# Patient Record
Sex: FEMALE | Race: WHITE | NOT HISPANIC OR LATINO | Employment: FULL TIME | URBAN - METROPOLITAN AREA
[De-identification: names, ages, dates, MRNs, and addresses within clinical notes are randomized per-mention and may not be internally consistent; named-entity substitution may affect disease eponyms.]

---

## 2017-11-20 ENCOUNTER — HOSPITAL ENCOUNTER (OUTPATIENT)
Dept: LAB | Facility: MEDICAL CENTER | Age: 27
End: 2017-11-20
Attending: PHYSICIAN ASSISTANT
Payer: COMMERCIAL

## 2017-11-20 ENCOUNTER — OFFICE VISIT (OUTPATIENT)
Dept: URGENT CARE | Facility: CLINIC | Age: 27
End: 2017-11-20
Payer: COMMERCIAL

## 2017-11-20 VITALS
SYSTOLIC BLOOD PRESSURE: 92 MMHG | TEMPERATURE: 98.7 F | DIASTOLIC BLOOD PRESSURE: 50 MMHG | BODY MASS INDEX: 21.21 KG/M2 | WEIGHT: 132 LBS | HEIGHT: 66 IN | HEART RATE: 88 BPM | OXYGEN SATURATION: 96 % | RESPIRATION RATE: 12 BRPM

## 2017-11-20 DIAGNOSIS — R11.0 NAUSEA: ICD-10-CM

## 2017-11-20 DIAGNOSIS — R42 LIGHTHEADEDNESS: ICD-10-CM

## 2017-11-20 DIAGNOSIS — R55 NEAR SYNCOPE: ICD-10-CM

## 2017-11-20 LAB
ALBUMIN SERPL BCP-MCNC: 3.8 G/DL (ref 3.2–4.9)
ALBUMIN/GLOB SERPL: 1.2 G/DL
ALP SERPL-CCNC: 49 U/L (ref 30–99)
ALT SERPL-CCNC: 15 U/L (ref 2–50)
ANION GAP SERPL CALC-SCNC: 8 MMOL/L (ref 0–11.9)
AST SERPL-CCNC: 20 U/L (ref 12–45)
BASOPHILS # BLD AUTO: 0.7 % (ref 0–1.8)
BASOPHILS # BLD: 0.06 K/UL (ref 0–0.12)
BILIRUB SERPL-MCNC: 0.5 MG/DL (ref 0.1–1.5)
BUN SERPL-MCNC: 16 MG/DL (ref 8–22)
CALCIUM SERPL-MCNC: 9.3 MG/DL (ref 8.5–10.5)
CHLORIDE SERPL-SCNC: 104 MMOL/L (ref 96–112)
CO2 SERPL-SCNC: 24 MMOL/L (ref 20–33)
CREAT SERPL-MCNC: 0.9 MG/DL (ref 0.5–1.4)
EOSINOPHIL # BLD AUTO: 0.12 K/UL (ref 0–0.51)
EOSINOPHIL NFR BLD: 1.4 % (ref 0–6.9)
ERYTHROCYTE [DISTWIDTH] IN BLOOD BY AUTOMATED COUNT: 43.2 FL (ref 35.9–50)
GFR SERPL CREATININE-BSD FRML MDRD: >60 ML/MIN/1.73 M 2
GLOBULIN SER CALC-MCNC: 3.1 G/DL (ref 1.9–3.5)
GLUCOSE BLD-MCNC: 100 MG/DL (ref 70–100)
GLUCOSE SERPL-MCNC: 73 MG/DL (ref 65–99)
HCT VFR BLD AUTO: 38.8 % (ref 37–47)
HGB BLD-MCNC: 12.2 G/DL (ref 12–16)
IMM GRANULOCYTES # BLD AUTO: 0.03 K/UL (ref 0–0.11)
IMM GRANULOCYTES NFR BLD AUTO: 0.4 % (ref 0–0.9)
INT CON NEG: NEGATIVE
INT CON POS: POSITIVE
LYMPHOCYTES # BLD AUTO: 2.16 K/UL (ref 1–4.8)
LYMPHOCYTES NFR BLD: 25.4 % (ref 22–41)
MCH RBC QN AUTO: 29.8 PG (ref 27–33)
MCHC RBC AUTO-ENTMCNC: 31.4 G/DL (ref 33.6–35)
MCV RBC AUTO: 94.6 FL (ref 81.4–97.8)
MONOCYTES # BLD AUTO: 0.57 K/UL (ref 0–0.85)
MONOCYTES NFR BLD AUTO: 6.7 % (ref 0–13.4)
NEUTROPHILS # BLD AUTO: 5.58 K/UL (ref 2–7.15)
NEUTROPHILS NFR BLD: 65.4 % (ref 44–72)
NRBC # BLD AUTO: 0 K/UL
NRBC BLD AUTO-RTO: 0 /100 WBC
PLATELET # BLD AUTO: 304 K/UL (ref 164–446)
PMV BLD AUTO: 10.4 FL (ref 9–12.9)
POC URINE PREGNANCY TEST: NEGATIVE
POTASSIUM SERPL-SCNC: 4.2 MMOL/L (ref 3.6–5.5)
PROT SERPL-MCNC: 6.9 G/DL (ref 6–8.2)
RBC # BLD AUTO: 4.1 M/UL (ref 4.2–5.4)
SODIUM SERPL-SCNC: 136 MMOL/L (ref 135–145)
WBC # BLD AUTO: 8.5 K/UL (ref 4.8–10.8)

## 2017-11-20 PROCEDURE — 36415 COLL VENOUS BLD VENIPUNCTURE: CPT

## 2017-11-20 PROCEDURE — 80053 COMPREHEN METABOLIC PANEL: CPT

## 2017-11-20 PROCEDURE — 85025 COMPLETE CBC W/AUTO DIFF WBC: CPT

## 2017-11-20 PROCEDURE — 82962 GLUCOSE BLOOD TEST: CPT | Performed by: PHYSICIAN ASSISTANT

## 2017-11-20 PROCEDURE — 81025 URINE PREGNANCY TEST: CPT | Performed by: PHYSICIAN ASSISTANT

## 2017-11-20 PROCEDURE — 99204 OFFICE O/P NEW MOD 45 MIN: CPT | Performed by: PHYSICIAN ASSISTANT

## 2017-11-20 ASSESSMENT — ENCOUNTER SYMPTOMS: DIZZINESS: 1

## 2017-11-21 ASSESSMENT — ENCOUNTER SYMPTOMS
VERTIGO: 0
FALLS: 1
HEADACHES: 1
WHEEZING: 0
MYALGIAS: 0
JOINT SWELLING: 0
FOCAL WEAKNESS: 0
SENSORY CHANGE: 0
FEVER: 0
ABDOMINAL PAIN: 0
VOMITING: 0
LOSS OF CONSCIOUSNESS: 0
DOUBLE VISION: 0
EYE DISCHARGE: 0
EYE REDNESS: 0
SORE THROAT: 0
NAUSEA: 1
VISUAL CHANGE: 0
SEIZURES: 0
CHILLS: 0
COUGH: 0
BLURRED VISION: 0
PHOTOPHOBIA: 0
TINGLING: 0
EYE PAIN: 0
NECK PAIN: 0
SPEECH CHANGE: 0

## 2017-11-21 NOTE — PROGRESS NOTES
"Subjective:      Melanie Bruno is a 27 y.o. female who presents with Dizziness (xtoday, dizzines, fatigue, blacked out in the morning)            Pt is 26 y/o female who presents to  after an episodes of near syncope in the shower this morning. She awoke feeling a little tired, she was in the shower for a few minutes, she then felt light headed with some nausea and sat down. She started to feel better and stood back up when within a few seconds she felt similar symptoms- she again went to sit down when \"things got black\" and she fell in the tub. She did not hit her head or neck. She reports that she doesn't think that she completely had LOC- but was very close. She denies any CP with the episode. She did feel nauseated. Now she feels a little fatigued, but denies any continued lightheadedness. She does a minimal HA at this time. She denies any prior hx of hypoglycemia, or hx of other cardiac condition.   Of note she reports hx of same in the past when she has been ill or when she gives blood. She reports that her sister does have a condition that caused LOC- sounds like PoTS at this time.       Dizziness   This is a recurrent problem. The current episode started today. The problem has been resolved. Associated symptoms include headaches and nausea. Pertinent negatives include no abdominal pain, chest pain, chills, congestion, coughing, fever, joint swelling, myalgias, neck pain, rash, sore throat, urinary symptoms, vertigo, visual change or vomiting. Nothing aggravates the symptoms. She has tried nothing for the symptoms.       Review of Systems   Constitutional: Positive for malaise/fatigue. Negative for chills and fever.   HENT: Negative for congestion, ear discharge, ear pain and sore throat.    Eyes: Negative for blurred vision, double vision, photophobia, pain, discharge and redness.   Respiratory: Negative for cough and wheezing.    Cardiovascular: Negative for chest pain and leg swelling. " "  Gastrointestinal: Positive for nausea. Negative for abdominal pain and vomiting.   Genitourinary: Negative for dysuria and urgency.   Musculoskeletal: Positive for falls. Negative for joint pain, joint swelling, myalgias and neck pain.   Skin: Negative for itching and rash.   Neurological: Positive for dizziness and headaches. Negative for vertigo, tingling, sensory change, speech change, focal weakness, seizures and loss of consciousness.   All other systems are reviewed and are negative.          Objective:     BP (!) 92/50   Pulse 88   Temp 37.1 °C (98.7 °F)   Resp 12   Ht 1.676 m (5' 6\")   Wt 59.9 kg (132 lb)   SpO2 96%   BMI 21.31 kg/m²    PMH:  has no past medical history on file.  MEDS:   Current Outpatient Prescriptions:   •  NON SPECIFIED, , Disp: , Rfl:   ALLERGIES:   Allergies   Allergen Reactions   • Sulfa Drugs      SURGHX: No past surgical history on file.  SOCHX:  reports that she has never smoked. She has never used smokeless tobacco.  FH: Family history was reviewed, no pertinent findings to report    Physical Exam   Constitutional: She is oriented to person, place, and time. She appears well-developed and well-nourished.   HENT:   Head: Normocephalic and atraumatic.   Right Ear: External ear normal.   Left Ear: External ear normal.   Nose: Nose normal.   Mouth/Throat: Oropharynx is clear and moist. No oropharyngeal exudate.   Eyes: EOM are normal. Pupils are equal, round, and reactive to light.   Neck: Normal range of motion. Neck supple.   Cardiovascular: Normal rate, regular rhythm and intact distal pulses.  Exam reveals no friction rub.    No murmur heard.  Pulmonary/Chest: Effort normal and breath sounds normal. No respiratory distress.   Musculoskeletal: Normal range of motion. She exhibits no edema.   Lymphadenopathy:     She has no cervical adenopathy.   Neurological: She is alert and oriented to person, place, and time. She displays normal reflexes. No cranial nerve deficit. She " exhibits normal muscle tone. Coordination normal.   Neg. Finger to nose, neg. Pronator drift, neg. Rhomberg. OLIVER's appropriate. Gait steady.    Skin: Skin is warm. No rash noted.   Psychiatric: She has a normal mood and affect. Her behavior is normal.   Vitals reviewed.           POC HCG- negative. POC glucose 100  EKG: NSR at a rate of 78- Without acute ST changes, normal axis, no old to compare.   Orthos- non-orthostatic- see vitals.     Assessment/Plan:     1. Near syncope  - POCT PREGNANCY  - EKG  - CBC WITH DIFFERENTIAL; Future  - COMP METABOLIC PANEL; Future  - POCT Glucose    2. Nausea  3. Lightheadedness    At this time it does appear that the provocative factor was the hot steaming shower of which the patient may of locked her knees. Patient and her boyfriend were very concerned about  Such today- EKG WNL, not with Orthos at this time, glucose is normal, and she is not pregnant at this time.   I will R/O anemia, and electrolyte abnormalities. If such is WNL will make referral to cardiology as they may consider Holter monitor.   I will alert this patient as results return.   Pt. Was agreeable to plan- at this time patient is asymptomatic without any lightheadedness.   Patient given precautionary s/sx that mandate immediate follow up and evaluation in the ED. Advised of risks of not doing so.    DDX, Supportive care, and indications for immediate follow-up discussed with patient.    Instructed to return to clinic or nearest emergency department if we are not available for any change in condition, further concerns, or worsening of symptoms.    The patient demonstrated a good understanding and agreed with the treatment plan.

## 2017-11-27 ENCOUNTER — TELEPHONE (OUTPATIENT)
Dept: CARDIOLOGY | Facility: MEDICAL CENTER | Age: 27
End: 2017-11-27

## 2017-11-30 ENCOUNTER — TELEPHONE (OUTPATIENT)
Dept: CARDIOLOGY | Facility: MEDICAL CENTER | Age: 27
End: 2017-11-30

## 2017-11-30 ENCOUNTER — OFFICE VISIT (OUTPATIENT)
Dept: CARDIOLOGY | Facility: MEDICAL CENTER | Age: 27
End: 2017-11-30
Payer: COMMERCIAL

## 2017-11-30 VITALS
WEIGHT: 135 LBS | SYSTOLIC BLOOD PRESSURE: 102 MMHG | OXYGEN SATURATION: 97 % | HEART RATE: 70 BPM | BODY MASS INDEX: 21.69 KG/M2 | DIASTOLIC BLOOD PRESSURE: 70 MMHG | HEIGHT: 66 IN

## 2017-11-30 DIAGNOSIS — R55 SYNCOPE, UNSPECIFIED SYNCOPE TYPE: ICD-10-CM

## 2017-11-30 LAB — EKG IMPRESSION: NORMAL

## 2017-11-30 PROCEDURE — 93000 ELECTROCARDIOGRAM COMPLETE: CPT | Performed by: INTERNAL MEDICINE

## 2017-11-30 PROCEDURE — 99203 OFFICE O/P NEW LOW 30 MIN: CPT | Performed by: INTERNAL MEDICINE

## 2017-11-30 ASSESSMENT — ENCOUNTER SYMPTOMS
HEADACHES: 1
BLURRED VISION: 1
WEAKNESS: 1

## 2017-11-30 NOTE — PROGRESS NOTES
Cardiology Initial Consultation Note    Date of note:    11/30/2017    Primary Care Provider: Pcp Pt States None  Referring Provider: David Garrison P.A.    Patient Name: Melanie Bruno   YOB: 1990  MRN:              [id number]    Chief Complaint: syncope    History of Present Illness: Melanie Bruno is a 27 y.o. Female with no past medical history who is here for cardiac consultation for syncope    She reports about a week and a half ago she was in the shower and had acute onset of lightheadedness/dizziness and decrement in her vision.  She laid down in the shower and this improved, and when she stood back up to finish her shower, she then had severe nausea and started to lay back down, but passed out.  Very brief LOC per report and she woke up almost immediately and laid on the floor for 5 minutes with complete resolution of symptoms.  She was evaluated with an  EKG in urgent care 9/20/17 and had a normal EKG and lab tests at that time.  She was scheduled for cardiology follow-up. Before this episode, she denied any illnesses, abdominal discomfort or diarrhea/vomiting, decreased PO intake, or alcohol or drug use.     She has had one prior episode of syncope, while using bathroom while she was febrile and had a viral illness.  This was 5 years ago.    She has had no recurrent syncope.     1-2 cups of caffeine (coffee/day)     Review of Systems   Constitution: Positive for weakness and malaise/fatigue.   Eyes: Positive for blurred vision.   Neurological: Positive for headaches.   Allergic/Immunologic: Positive for environmental allergies.         All other systems reviewed by comprehensive questionnaire and are negative.       No PMHx or PSHx.     Current Outpatient Prescriptions   Medication Sig Dispense Refill   • NON SPECIFIED        No current facility-administered medications for this visit.      On birth control     Allergies   Allergen Reactions   • Sulfa Drugs   "        Family History   Problem Relation Age of Onset   • Other Sister      vasovagal syncope         Social History     Social History   • Marital status: Single     Spouse name: N/A   • Number of children: N/A   • Years of education: N/A     Occupational History   • Not on file.     Social History Main Topics   • Smoking status: Never Smoker   • Smokeless tobacco: Never Used   • Alcohol use Yes      Comment: occ.   • Drug use: Unknown   • Sexual activity: Not on file     Other Topics Concern   • Not on file     Social History Narrative   • No narrative on file         Physical Exam:  Ambulatory Vitals  Blood pressure 102/70, pulse 70, height 1.676 m (5' 6\"), weight 61.2 kg (135 lb), SpO2 97 %.   Oxygen Therapy:  Pulse Oximetry: 97 %  BP Readings from Last 4 Encounters:   11/30/17 102/70   11/20/17 (!) 92/50       Weight/BMI: Body mass index is 21.79 kg/m².  Wt Readings from Last 4 Encounters:   11/30/17 61.2 kg (135 lb)   11/20/17 59.9 kg (132 lb)     General: Well appearing and in no apparent distress  Eyes: nl conjunctiva  ENT: OP clear  Neck: JVP 4 cm H2O, no carotid bruits  Lungs: normal respiratory effort, CTAB  Heart: RRR, no murmurs, no rubs or gallops, no edema bilateral lower extremities. No LV/RV heave on cardiac palpatation. 2+ bilateral radial pulses.  2+ bilateral dp pulses.   Abdomen: soft, non tender, non distended, no masses, normal bowel sounds.  No HSM.  Extremities/MSK: no clubbing, no cyanosis  Neurological: No focal sensory deficits  Psychiatric: Appropriate affect, A/O x 3  Skin: Warm extremities      Lab Data Review:  No results found for: CHOLSTRLTOT, LDL, HDL, TRIGLYCERIDE    Lab Results   Component Value Date/Time    SODIUM 136 11/20/2017 03:36 PM    POTASSIUM 4.2 11/20/2017 03:36 PM    CHLORIDE 104 11/20/2017 03:36 PM    CO2 24 11/20/2017 03:36 PM    GLUCOSE 73 11/20/2017 03:36 PM    BUN 16 11/20/2017 03:36 PM    CREATININE 0.90 11/20/2017 03:36 PM     CrCl cannot be calculated " (Patient's most recent lab result is older than the maximum 7 days allowed.).  Lab Results   Component Value Date/Time    ALKPHOSPHAT 49 2017 03:36 PM    ASTSGOT 20 2017 03:36 PM    ALTSGPT 15 2017 03:36 PM    TBILIRUBIN 0.5 2017 03:36 PM      Lab Results   Component Value Date/Time    WBC 8.5 2017 03:36 PM     No results found for: HBA1C  No components found for: TROP      Cardiac Imaging and Procedures Review:    EKG dated 17 : My personal interpretation is sinus arrhythmia, otherwise normal EKG.       Medical Decision Makin. Syncope, unspecified syncope type  Likely vasovagal, given lack of illness or predisposing factors in this episode, and family history, will evaluate with tilt table test.  -tilt table  -information on vasovagal syncope discussed at length and given with AVS.       Return if symptoms worsen or fail to improve.      Oleksandr Rain MD  [facility]  [facility], [address]  [address] [address]  Phone: [phone]  Fax: [phone]

## 2017-12-05 ENCOUNTER — TELEPHONE (OUTPATIENT)
Dept: CARDIOLOGY | Facility: MEDICAL CENTER | Age: 27
End: 2017-12-05

## 2017-12-05 NOTE — TELEPHONE ENCOUNTER
Patient is scheduled on 12-18-17 for a Passive tilt table at [facility] per Dr. Rain's request. Patient was told to check in at 9:30 for a 10:00 procedure.

## 2017-12-15 NOTE — TELEPHONE ENCOUNTER
Per patients request she has been rescheduled from 12-18-17 to 12-27-17 for her passive tilt table test. Patient to check in at 9:30 for a 10:00 test.

## 2017-12-27 ENCOUNTER — HOSPITAL ENCOUNTER (OUTPATIENT)
Dept: CARDIOLOGY | Facility: MEDICAL CENTER | Age: 27
End: 2017-12-27
Attending: INTERNAL MEDICINE
Payer: COMMERCIAL

## 2017-12-27 PROCEDURE — 93660 TILT TABLE EVALUATION: CPT

## 2017-12-27 NOTE — PROGRESS NOTES
"Patient for tilt table. Patient signed consent, refused IV. Patient set up to EKG leads and baseline blood pressure was obtained. Patient stood upright.  Patient declines any signs of symptoms. At minute 7 patient states\" she thinks she's going to black out\". At minute 8 patient has syncopal episode, bradycardia in the 40's and systolic BP in the 70's. Patient immediately laid down, test aborted. Patient remained in the 50-60's post for 3 minutes prior to rate going back to base line in the 70's. BP back to normal range. Patient able to sit upright and ambulate. Patient taken back to waiting room where family was.   "

## 2017-12-28 NOTE — PROCEDURES
"DATE OF SERVICE:  12/27/2017    PROCEDURE PERFORMED:  Tilt table test.    Resting EKG, sinus rhythm at 79 beats per minute, normal EKG.  Baseline blood   pressure 116/74.    COMMENTS:  The patient was tilted into the semi upright position and blood   pressure and EKG were monitored every 1 minute.  At the 7th minute, she \"felt   like blacking out\" and EKG at that time demonstrated heart rate of 55 per   minute.  The patient passed out and was returned to the horizontal position.    Her shaylee heart rate was 46 beats per minute and systolic blood pressure was 70   systolic.  The patient then recovered without incident.    IMPRESSION:  Tilt table test positive for syncope with associated bradycardia   and hypotension with lowest heart rate 46 beats per minute.  The patient had   an uneventful and spontaneous recovery when returned to the supine position.       ____________________________________     MD SHELLIE ARRIAGA / JIN    DD:  12/27/2017 17:28:22  DT:  12/27/2017 19:09:23    D#:  [id number]  Job#:  [id number]    "

## 2017-12-29 ENCOUNTER — TELEPHONE (OUTPATIENT)
Dept: CARDIOLOGY | Facility: MEDICAL CENTER | Age: 27
End: 2017-12-29

## 2017-12-30 NOTE — TELEPHONE ENCOUNTER
Attempted to call Laureen to discuss results of tilt table test.    Please let her know it was positive as suspected for vasovagal syncope.  Patient instructions and advice to avoid recurrent syncope were discussed at our last visit, but please see if she has any questions.  She can also schedule a follow-up appointment if she would like to speak further. Thanks!

## 2018-01-03 NOTE — TELEPHONE ENCOUNTER
Second call attempted, no answer, left vm to call back     Letter mailed to pt with above information with information above vasovagal syncope printed from [facility]

## 2018-02-21 ENCOUNTER — OFFICE VISIT (OUTPATIENT)
Dept: INTERNAL MEDICINE | Facility: MEDICAL CENTER | Age: 28
End: 2018-02-21
Payer: COMMERCIAL

## 2018-02-21 ENCOUNTER — HOSPITAL ENCOUNTER (OUTPATIENT)
Facility: MEDICAL CENTER | Age: 28
End: 2018-02-21
Attending: INTERNAL MEDICINE
Payer: COMMERCIAL

## 2018-02-21 VITALS
WEIGHT: 126.6 LBS | HEART RATE: 78 BPM | BODY MASS INDEX: 20.34 KG/M2 | SYSTOLIC BLOOD PRESSURE: 110 MMHG | TEMPERATURE: 99.2 F | HEIGHT: 66 IN | OXYGEN SATURATION: 95 % | DIASTOLIC BLOOD PRESSURE: 63 MMHG

## 2018-02-21 DIAGNOSIS — R10.2 ACUTE PELVIC PAIN: ICD-10-CM

## 2018-02-21 DIAGNOSIS — Z00.00 HEALTHCARE MAINTENANCE: ICD-10-CM

## 2018-02-21 LAB
APPEARANCE UR: CLEAR
BILIRUB UR STRIP-MCNC: NEGATIVE MG/DL
COLOR UR AUTO: YELLOW
GLUCOSE UR STRIP.AUTO-MCNC: NEGATIVE MG/DL
INT CON NEG: NORMAL
INT CON POS: NORMAL
KETONES UR STRIP.AUTO-MCNC: NEGATIVE MG/DL
LEUKOCYTE ESTERASE UR QL STRIP.AUTO: NEGATIVE
NITRITE UR QL STRIP.AUTO: NEGATIVE
PH UR STRIP.AUTO: 7.5 [PH] (ref 5–8)
POC URINE PREGNANCY TEST: NEGATIVE
PROT UR QL STRIP: NEGATIVE MG/DL
RBC UR QL AUTO: NEGATIVE
SP GR UR STRIP.AUTO: 1
UROBILINOGEN UR STRIP-MCNC: NEGATIVE MG/DL

## 2018-02-21 PROCEDURE — 87086 URINE CULTURE/COLONY COUNT: CPT

## 2018-02-21 PROCEDURE — 81025 URINE PREGNANCY TEST: CPT | Performed by: INTERNAL MEDICINE

## 2018-02-21 PROCEDURE — 81002 URINALYSIS NONAUTO W/O SCOPE: CPT | Performed by: INTERNAL MEDICINE

## 2018-02-21 PROCEDURE — 99000 SPECIMEN HANDLING OFFICE-LAB: CPT | Performed by: INTERNAL MEDICINE

## 2018-02-21 PROCEDURE — 99204 OFFICE O/P NEW MOD 45 MIN: CPT | Mod: 25 | Performed by: INTERNAL MEDICINE

## 2018-02-21 RX ORDER — IBUPROFEN 200 MG
200 TABLET ORAL EVERY 8 HOURS PRN
Qty: 20 TAB | Refills: 0 | Status: SHIPPED | OUTPATIENT
Start: 2018-02-21 | End: 2018-02-27

## 2018-02-21 ASSESSMENT — PATIENT HEALTH QUESTIONNAIRE - PHQ9: CLINICAL INTERPRETATION OF PHQ2 SCORE: 0

## 2018-02-21 ASSESSMENT — PAIN SCALES - GENERAL: PAINLEVEL: 7=MODERATE-SEVERE PAIN

## 2018-02-21 NOTE — PROGRESS NOTES
New Patient to Establish    Reason to establish: Evaluation of pelvic pain    CC: Pelvic pain    HPI: 27-year-old female patient with past medical history of TMJ joint, acne, irregular periods comes in for evaluation of ongoing pelvic pain.  Patient states she went skiing last weekend and since then experiencing left ongoing pelvic pain. Started mild in intensity initially but subsequently gotten worse 6/10 in intensity, dull in quality. No radiation. Walking makes the pain worse. Less in intensity when she is resting. Denies fever, chills, nausea, vomiting, constipation, diarrhea, blood or mucus in the stool, dark stools, pain during urination, burning urination, vaginal discharge, vaginal bleeding, dyspareunia.  Patient states she has irregular periods especially when she is stressed. Her last period was in December. She was on birth control pills until last week of November and she got off of the birth control pills since then. After her last period in December she did not have any menstrual cycles since then. She states she sometimes doesn't get menstrual cycle when she is under a lot of stressors. She states she was stressed for the month of January. Currently sexually active with single partner. Denies smoking, illicit drug use. Drinks alcohol 1 or 2 glasses of wine per week occasionally.    Patient Active Problem List    Diagnosis Date Noted   • Temporomandibular joint disorder 03/10/2010   • Other acne 08/21/2007       History reviewed. No pertinent past medical history.    Current Outpatient Prescriptions   Medication Sig Dispense Refill   • ibuprofen (MOTRIN) 200 MG Tab Take 1 Tab by mouth every 8 hours as needed for Mild Pain. 20 Tab 0   • NON SPECIFIED        No current facility-administered medications for this visit.        Allergies as of 02/21/2018 - Reviewed 02/21/2018   Allergen Reaction Noted   • Bactrim ds  12/31/2015   • Sulfa drugs  11/20/2017       Social History     Social History   • Marital  "status: Single     Spouse name: N/A   • Number of children: N/A   • Years of education: N/A     Occupational History   • Not on file.     Social History Main Topics   • Smoking status: Never Smoker   • Smokeless tobacco: Never Used   • Alcohol use 3.0 oz/week     2 Glasses of wine, 3 Standard drinks or equivalent per week      Comment: once or twice a week   • Drug use: No   • Sexual activity: Yes     Partners: Male     Birth control/ protection: Condom     Other Topics Concern   • Not on file     Social History Narrative   • No narrative on file       Family History   Problem Relation Age of Onset   • Depression Brother    • Stroke Maternal Grandmother    • Breast Cancer Maternal Grandmother    • Alzheimer's Disease Maternal Grandmother    • Dementia Paternal Grandmother    • Stroke Paternal Grandfather    • Diabetes Paternal Grandfather        History reviewed. No pertinent surgical history.    ROS: As per HPI. Additional pertinent symptoms as noted below.    Constitutional: Denies fever/chills/weight changes.   Eyes: Denies changes/pain in vision  ENT: Denies sore throat/congestion/ear ache.   Cardiovascular: Denies chest pain /palpitations/edema.   Respiratory: Denies SOB/cough/PND/orthopnea  Abdomen: Denies difficulty swallowing/ diarrhea/constipation/abdominal pain/nausea/vomiting  Genitourinary: Normal urinary habits. Positive for left pelvic pain  Musculo-skeletal: normal ambulation.Denies muscle or joint pains.  Skin: Denies rash/lesions.  Neurological: Denies weakness/tingling/numbness/headache  Psychological: good mood and cooperative. Denies anxiety /depression       /63   Pulse 78   Temp 37.3 °C (99.2 °F)   Ht 1.676 m (5' 6\")   Wt 57.4 kg (126 lb 9.6 oz)   SpO2 95%   BMI 20.43 kg/m²     Physical Exam  General:  Alert and oriented, No apparent distress.    Eyes: Pupils equal and reactive. No scleral icterus.    Throat: Clear no erythema or exudates noted.    Neck: Supple. No lymphadenopathy " noted. Thyroid not enlarged.    Lungs: Clear to auscultation and percussion bilaterally.    Cardiovascular: Regular rate and rhythm. No murmurs, rubs or gallops.    Abdomen:  Benign. No rebound or guarding noted. Tenderness noted in the left lower quadrant and suprapubic region.    Extremities: No clubbing, cyanosis, edema.    Skin: Clear. No rash or suspicious skin lesions noted.    Neurological: Oriented to time, place, and person .Cranial nerves intact. No motor/sensory deficits.Reflexes were normal and symmetrical in both upper and lower extremities     Musculoskeletal : NROM of all extremities. No tenderness or deformity noted.       Assessment and Plan    1. Acute pelvic pain  - Patient complains of left sided pelvic pain for the past 1 week  -On examination tenderness noted in the left lower quadrant and suprapubic region  -Likely differentials-ovarian cyst, torsion, endometritis, PID, endometriosis, torsion of adnexa, sprain or strain, musculoskeletal, cystitis, urethritis, calculi  -Ordered pregnant test and urine analysis in the clinic-which are negative  -Ordered ultrasound of the pelvis  -Advised to take Motrin 1 tablet as needed every 8 hours for symptomatic pain  -Follow-up in one week      2. Healthcare maintenance  - Up to date regarding vaccinations-Gardasil, tetanus vaccination at the age of 20.  - Last Pap smear 2-3 years back-was negative and due for one this year. Will consider ordering in the next visit.  - Not a candidate for mammogram, colonoscopy, DEXA scan yet        Risk Assessment (discuss potential complications a function of chronic problems): spent 35 min's discussing plans of management and educating patient regarding her current condition and related complications    Complexity (discuss number of co-morbidities): Discussed differentials for acute pelvic pain-endometriosis, ovarian torsion, musculoskeletal, cystitis, PID, endometritis, sprain or strain, torsion of adnexa, ovarian  cyst, urethritis, cystitis, calculi.    Signed by: Latisha Solis M.D.

## 2018-02-21 NOTE — PATIENT INSTRUCTIONS
Pelvic Pain, Female  Female pelvic pain can be caused by many different things and start from a variety of places. Pelvic pain refers to pain that is located in the lower half of the abdomen and between your hips. The pain may occur over a short period of time (acute) or may be reoccurring (chronic). The cause of pelvic pain may be related to disorders affecting the female reproductive organs (gynecologic), but it may also be related to the bladder, kidney stones, an intestinal complication, or muscle or skeletal problems. Getting help right away for pelvic pain is important, especially if there has been severe, sharp, or a sudden onset of unusual pain. It is also important to get help right away because some types of pelvic pain can be life threatening.   CAUSES   Below are only some of the causes of pelvic pain. The causes of pelvic pain can be in one of several categories.   · Gynecologic.  ¨ Pelvic inflammatory disease.  ¨ Sexually transmitted infection.  ¨ Ovarian cyst or a twisted ovarian ligament (ovarian torsion).  ¨ Uterine lining that grows outside the uterus (endometriosis).  ¨ Fibroids, cysts, or tumors.  ¨ Ovulation.  · Pregnancy.  ¨ Pregnancy that occurs outside the uterus (ectopic pregnancy).  ¨ Miscarriage.  ¨ Labor.  ¨ Abruption of the placenta or ruptured uterus.  · Infection.  ¨ Uterine infection (endometritis).  ¨ Bladder infection.  ¨ Diverticulitis.  ¨ Miscarriage related to a uterine infection (septic ).  · Bladder.  ¨ Inflammation of the bladder (cystitis).  ¨ Kidney stone(s).  · Gastrointestinal.  ¨ Constipation.  ¨ Diverticulitis.  · Neurologic.  ¨ Trauma.  ¨ Feeling pelvic pain because of mental or emotional causes (psychosomatic).  · Cancers of the bowel or pelvis.  EVALUATION   Your caregiver will want to take a careful history of your concerns. This includes recent changes in your health, a careful gynecologic history of your periods (menses), and a sexual history. Obtaining  your family history and medical history is also important. Your caregiver may suggest a pelvic exam. A pelvic exam will help identify the location and severity of the pain. It also helps in the evaluation of which organ system may be involved. In order to identify the cause of the pelvic pain and be properly treated, your caregiver may order tests. These tests may include:   · A pregnancy test.  · Pelvic ultrasonography.  · An X-ray exam of the abdomen.  · A urinalysis or evaluation of vaginal discharge.  · Blood tests.  HOME CARE INSTRUCTIONS   · Only take over-the-counter or prescription medicines for pain, discomfort, or fever as directed by your caregiver.    · Rest as directed by your caregiver.    · Eat a balanced diet.    · Drink enough fluids to make your urine clear or pale yellow, or as directed.    · Avoid sexual intercourse if it causes pain.    · Apply warm or cold compresses to the lower abdomen depending on which one helps the pain.    · Avoid stressful situations.    · Keep a journal of your pelvic pain. Write down when it started, where the pain is located, and if there are things that seem to be associated with the pain, such as food or your menstrual cycle.  · Follow up with your caregiver as directed.    SEEK MEDICAL CARE IF:  · Your medicine does not help your pain.  · You have abnormal vaginal discharge.  SEEK IMMEDIATE MEDICAL CARE IF:   · You have heavy bleeding from the vagina.    · Your pelvic pain increases.    · You feel light-headed or faint.    · You have chills.    · You have pain with urination or blood in your urine.    · You have uncontrolled diarrhea or vomiting.    · You have a fever or persistent symptoms for more than 3 days.  · You have a fever and your symptoms suddenly get worse.    · You are being physically or sexually abused.    MAKE SURE YOU:  · Understand these instructions.  · Will watch your condition.  · Will get help if you are not doing well or get worse.     This  information is not intended to replace advice given to you by your health care provider. Make sure you discuss any questions you have with your health care provider.     Document Released: 11/14/2005 Document Revised: 05/03/2016 Document Reviewed: 04/08/2013  [employer] Interactive Patient Education ©2016 [employer]

## 2018-02-21 NOTE — LETTER
February 21, 2018         Melanie Bruno  [address]  [facility] [address]        Dear Melanie:      Below are the results from your recent visit:    Resulted Orders   POCT Pregnancy   Result Value Ref Range    POC Urine Pregnancy Test negative Negative    Internal Control Positive Valid     Internal Control Negative Valid    POCT Urinalysis   Result Value Ref Range    POC Color yellow Negative    POC Appearance clear Negative    POC Leukocyte Esterase negative Negative    POC Nitrites negative Negative    POC Urobiligen negative Negative (0.2) mg/dL    POC Protein negative Negative mg/dL    POC Urine PH 7.5 5.0 - 8.0    POC Blood negative Negative    POC Specific Gravity 1.000 <1.005 - >1.030    POC Ketones negative Negative mg/dL    POC Biliruben negative Negative mg/dL    POC Glucose negative Negative mg/dL     The test results show  normal .    If you have any questions or concerns, please don't hesitate to call.        Sincerely,      Latisha Solis M.D.    Electronically Signed

## 2018-02-23 ENCOUNTER — HOSPITAL ENCOUNTER (OUTPATIENT)
Dept: RADIOLOGY | Facility: MEDICAL CENTER | Age: 28
End: 2018-02-23
Attending: INTERNAL MEDICINE
Payer: COMMERCIAL

## 2018-02-23 DIAGNOSIS — R10.2 ACUTE PELVIC PAIN: ICD-10-CM

## 2018-02-23 LAB
BACTERIA UR CULT: NORMAL
SIGNIFICANT IND 70042: NORMAL
SITE SITE: NORMAL
SOURCE SOURCE: NORMAL

## 2018-02-23 PROCEDURE — 76857 US EXAM PELVIC LIMITED: CPT

## 2018-02-27 ENCOUNTER — OFFICE VISIT (OUTPATIENT)
Dept: INTERNAL MEDICINE | Facility: MEDICAL CENTER | Age: 28
End: 2018-02-27
Payer: COMMERCIAL

## 2018-02-27 VITALS
WEIGHT: 128.4 LBS | DIASTOLIC BLOOD PRESSURE: 76 MMHG | TEMPERATURE: 98.1 F | OXYGEN SATURATION: 98 % | SYSTOLIC BLOOD PRESSURE: 112 MMHG | HEIGHT: 66 IN | BODY MASS INDEX: 20.63 KG/M2 | RESPIRATION RATE: 14 BRPM | HEART RATE: 72 BPM

## 2018-02-27 DIAGNOSIS — R10.32 LEFT INGUINAL PAIN: ICD-10-CM

## 2018-02-27 DIAGNOSIS — Z30.8 ENCOUNTER FOR OTHER CONTRACEPTIVE MANAGEMENT: ICD-10-CM

## 2018-02-27 DIAGNOSIS — R05.9 COUGH: ICD-10-CM

## 2018-02-27 PROCEDURE — 99214 OFFICE O/P EST MOD 30 MIN: CPT | Mod: GC | Performed by: INTERNAL MEDICINE

## 2018-02-27 RX ORDER — LEVONORGESTREL AND ETHINYL ESTRADIOL 0.1-0.02MG
1 KIT ORAL DAILY
Qty: 28 TAB | Refills: 12 | Status: SHIPPED | OUTPATIENT
Start: 2018-02-27 | End: 2021-05-20

## 2018-02-27 ASSESSMENT — PAIN SCALES - GENERAL: PAINLEVEL: NO PAIN

## 2018-02-27 NOTE — PROGRESS NOTES
Established patient      CC: Pelvic pain follow up    HPI: 27-year-old female patient with past medical history of TMJ joint, acne, irregular periods comes in for evaluation of ongoing pelvic pain.  Patient states she went skiing about 10 days ago and since then experiencing left ongoing pelvic pain. The pain was evaluated by our internal medicine team last week, and her had neg urine test, pregnancy test during that time. Also the patient had a trans-abd pelvic ultrasound last week, which showed a 4cm cyst at left side of ovary. The patient was taking oral contraceptive pill for years, which was stopped in the endof 2017 during to insurance/no prescription. The patient has irregular period in the last couple months and she is currently having menstrual in the office today (day 2).   The patient still has some pain at the anterior left side inguinal region close to her pubis. There was no definite change of her pain compared to last week, no improvement per the patient. The pain around 5/10 at most, mildly getting worse when palpated, no rebounding pain or true guarding in the office. There was no lesion on the skin, no bruise or any lump/bump. She was not in distress, all vitals were normal.   We discuss the finding of her ultrasound, explained to her the cyst or skeletal muscular injury might be the reason for the discomfort, no intervention needed at this moment, but the patient might need another ultrasound follow up if her symptoms are getting worse.    She also mentioned some dry cough in the past couple days without sputum, fever, or any strong evidence of infection.         Patient Active Problem List    Diagnosis Date Noted   • Left inguinal pain 02/27/2018   • Encounter for other contraceptive management 02/27/2018   • Cough 02/27/2018   • Temporomandibular joint disorder 03/10/2010   • Other acne 08/21/2007       History reviewed. No pertinent past medical history.    Current Outpatient Prescriptions    Medication Sig Dispense Refill   • levonorgestrel-ethinyl estradiol (AVIANE, ALESSE, LESSINA) 0.1-20 MG-MCG per tablet Take 1 Tab by mouth every day. 28 Tab 12   • NON SPECIFIED        No current facility-administered medications for this visit.        Allergies as of 02/27/2018 - Reviewed 02/27/2018   Allergen Reaction Noted   • Bactrim ds  12/31/2015   • Sulfa drugs  11/20/2017       Social History     Social History   • Marital status: Single     Spouse name: N/A   • Number of children: N/A   • Years of education: N/A     Occupational History   • Not on file.     Social History Main Topics   • Smoking status: Never Smoker   • Smokeless tobacco: Never Used   • Alcohol use 3.0 oz/week     2 Glasses of wine, 3 Standard drinks or equivalent per week      Comment: once or twice a week   • Drug use: No   • Sexual activity: Yes     Partners: Male     Birth control/ protection: Condom     Other Topics Concern   • Not on file     Social History Narrative   • No narrative on file       Family History   Problem Relation Age of Onset   • Depression Brother    • Stroke Maternal Grandmother    • Breast Cancer Maternal Grandmother    • Alzheimer's Disease Maternal Grandmother    • Dementia Paternal Grandmother    • Stroke Paternal Grandfather    • Diabetes Paternal Grandfather        History reviewed. No pertinent surgical history.    ROS: As per HPI. Additional pertinent symptoms as noted below.    Constitutional: Denies fever/chills/weight changes.   Eyes: Denies changes/pain in vision  ENT: Denies sore throat/congestion/ear ache.   Cardiovascular: Denies chest pain /palpitations/edema.   Respiratory: Denies SOB/cough/PND/orthopnea  Abdomen: Denies difficulty swallowing/ diarrhea/constipation/abdominal pain/nausea/vomiting  Genitourinary: Normal urinary habits. Positive for left pelvic pain, left side, low inguinal region.   Musculo-skeletal: normal ambulation.Denies muscle or joint pains.  Skin: Denies  "rash/lesions.  Neurological: Denies weakness/tingling/numbness/headache  Psychological: good mood and cooperative. Denies anxiety /depression       /76   Pulse 72   Temp 36.7 °C (98.1 °F)   Resp 14   Ht 1.676 m (5' 6\")   Wt 58.2 kg (128 lb 6.4 oz)   SpO2 98%   BMI 20.72 kg/m²     Physical Exam  HEENT: grossly normal   Cardiovascular: Normal heart rate, Normal rhythm   Lungs: Respiratory effort is normal. Normal breath sounds  Abdomen: Tenderness noted in the left lower quadrant and suprapubic region.  Skin: No erythema, No rash  Lower limbs: normal, no pitting edema   Neurologic: Alert & oriented x 3, Normal motor function, Normal sensory function, No focal deficits noted, cranial nerves II through XII are normal  PSY: stable mood.   Other systems also examined, grossly normal.       Abdomen:  Benign. No rebound or guarding noted.         Assessment and Plan  Problem List Items Addressed This Visit     Left inguinal pain  - Still presented after 1-week of observation/supportive care.   - Might be related to the ovary cyst or simple muscular injury  - No intervention at this moment, ultrasound order provided, she will do it only if the symptoms are getting worse.     Relevant Orders    US-PELVIS TRANSABDOMINAL LIMITED    Encounter for other contraceptive management  - The patient asked for being back on oral contraceptive management, we resume her Aviane.     Relevant Medications    levonorgestrel-ethinyl estradiol (AVIANE, ALESSE, LESSINA) 0.1-20 MG-MCG per tablet    Cough  - Dry cough for a couple days, no sputum, no other upper resp complaints.   - Clear breathing sound, no other concerns noted in the office.   -->Obs.      She had neg pap smear 2 yrs ago. Got all her vaccines. Will go back to see Dr. Solis for regular office visit.          Signed by: Zoey Clinton M.D.  "

## 2018-02-27 NOTE — PATIENT INSTRUCTIONS
Contraception Choices  Contraception (birth control) is the use of any methods or devices to prevent pregnancy. Below are some methods to help avoid pregnancy.  HORMONAL METHODS   · Contraceptive implant. This is a thin, plastic tube containing progesterone hormone. It does not contain estrogen hormone. Your health care provider inserts the tube in the inner part of the upper arm. The tube can remain in place for up to 3 years. After 3 years, the implant must be removed. The implant prevents the ovaries from releasing an egg (ovulation), thickens the cervical mucus to prevent sperm from entering the uterus, and thins the lining of the inside of the uterus.  · Progesterone-only injections. These injections are given every 3 months by your health care provider to prevent pregnancy. This synthetic progesterone hormone stops the ovaries from releasing eggs. It also thickens cervical mucus and changes the uterine lining. This makes it harder for sperm to survive in the uterus.  · Birth control pills. These pills contain estrogen and progesterone hormone. They work by preventing the ovaries from releasing eggs (ovulation). They also cause the cervical mucus to thicken, preventing the sperm from entering the uterus. Birth control pills are prescribed by a health care provider. Birth control pills can also be used to treat heavy periods.  · Minipill. This type of birth control pill contains only the progesterone hormone. They are taken every day of each month and must be prescribed by your health care provider.  · Birth control patch. The patch contains hormones similar to those in birth control pills. It must be changed once a week and is prescribed by a health care provider.  · Vaginal ring. The ring contains hormones similar to those in birth control pills. It is left in the vagina for 3 weeks, removed for 1 week, and then a new one is put back in place. The patient must be comfortable inserting and removing the ring  from the vagina. A health care provider's prescription is necessary.  · Emergency contraception. Emergency contraceptives prevent pregnancy after unprotected sexual intercourse. This pill can be taken right after sex or up to 5 days after unprotected sex. It is most effective the sooner you take the pills after having sexual intercourse. Most emergency contraceptive pills are available without a prescription. Check with your pharmacist. Do not use emergency contraception as your only form of birth control.  BARRIER METHODS   · Male condom. This is a thin sheath (latex or rubber) that is worn over the penis during sexual intercourse. It can be used with spermicide to increase effectiveness.  · Female condom. This is a soft, loose-fitting sheath that is put into the vagina before sexual intercourse.  · Diaphragm. This is a soft, latex, dome-shaped barrier that must be fitted by a health care provider. It is inserted into the vagina, along with a spermicidal jelly. It is inserted before intercourse. The diaphragm should be left in the vagina for 6 to 8 hours after intercourse.  · Cervical cap. This is a round, soft, latex or plastic cup that fits over the cervix and must be fitted by a health care provider. The cap can be left in place for up to 48 hours after intercourse.  · Sponge. This is a soft, circular piece of polyurethane foam. The sponge has spermicide in it. It is inserted into the vagina after wetting it and before sexual intercourse.  · Spermicides. These are chemicals that kill or block sperm from entering the cervix and uterus. They come in the form of creams, jellies, suppositories, foam, or tablets. They do not require a prescription. They are inserted into the vagina with an applicator before having sexual intercourse. The process must be repeated every time you have sexual intercourse.  INTRAUTERINE CONTRACEPTION  · Intrauterine device (IUD). This is a T-shaped device that is put in a woman's uterus  during a menstrual period to prevent pregnancy. There are 2 types:  ¨ Copper IUD. This type of IUD is wrapped in copper wire and is placed inside the uterus. Copper makes the uterus and fallopian tubes produce a fluid that kills sperm. It can stay in place for 10 years.  ¨ Hormone IUD. This type of IUD contains the hormone progestin (synthetic progesterone). The hormone thickens the cervical mucus and prevents sperm from entering the uterus, and it also thins the uterine lining to prevent implantation of a fertilized egg. The hormone can weaken or kill the sperm that get into the uterus. It can stay in place for 3-5 years, depending on which type of IUD is used.  PERMANENT METHODS OF CONTRACEPTION  · Female tubal ligation. This is when the woman's fallopian tubes are surgically sealed, tied, or blocked to prevent the egg from traveling to the uterus.  · Hysteroscopic sterilization. This involves placing a small coil or insert into each fallopian tube. Your doctor uses a technique called hysteroscopy to do the procedure. The device causes scar tissue to form. This results in permanent blockage of the fallopian tubes, so the sperm cannot fertilize the egg. It takes about 3 months after the procedure for the tubes to become blocked. You must use another form of birth control for these 3 months.  · Male sterilization. This is when the male has the tubes that carry sperm tied off (vasectomy). This blocks sperm from entering the vagina during sexual intercourse. After the procedure, the man can still ejaculate fluid (semen).  NATURAL PLANNING METHODS  · Natural family planning. This is not having sexual intercourse or using a barrier method (condom, diaphragm, cervical cap) on days the woman could become pregnant.  · Calendar method. This is keeping track of the length of each menstrual cycle and identifying when you are fertile.  · Ovulation method. This is avoiding sexual intercourse during ovulation.  · Symptothermal  method. This is avoiding sexual intercourse during ovulation, using a thermometer and ovulation symptoms.  · Post-ovulation method. This is timing sexual intercourse after you have ovulated.  Regardless of which type or method of contraception you choose, it is important that you use condoms to protect against the transmission of sexually transmitted infections (STIs). Talk with your health care provider about which form of contraception is most appropriate for you.     This information is not intended to replace advice given to you by your health care provider. Make sure you discuss any questions you have with your health care provider.     Document Released: 12/18/2006 Document Revised: 12/23/2014 Document Reviewed: 06/12/2014  [employer] Patient Education ©2016 [employer]

## 2018-12-14 ENCOUNTER — OFFICE VISIT (OUTPATIENT)
Dept: URGENT CARE | Facility: CLINIC | Age: 28
End: 2018-12-14
Payer: COMMERCIAL

## 2018-12-14 VITALS
WEIGHT: 134 LBS | DIASTOLIC BLOOD PRESSURE: 68 MMHG | OXYGEN SATURATION: 96 % | BODY MASS INDEX: 21.53 KG/M2 | TEMPERATURE: 98.3 F | SYSTOLIC BLOOD PRESSURE: 116 MMHG | HEIGHT: 66 IN | HEART RATE: 78 BPM | RESPIRATION RATE: 16 BRPM

## 2018-12-14 DIAGNOSIS — J02.9 PHARYNGITIS, UNSPECIFIED ETIOLOGY: ICD-10-CM

## 2018-12-14 DIAGNOSIS — J40 BRONCHITIS: ICD-10-CM

## 2018-12-14 LAB
INT CON NEG: NORMAL
INT CON POS: NORMAL
S PYO AG THROAT QL: NEGATIVE

## 2018-12-14 PROCEDURE — 87880 STREP A ASSAY W/OPTIC: CPT | Performed by: PHYSICIAN ASSISTANT

## 2018-12-14 PROCEDURE — 99214 OFFICE O/P EST MOD 30 MIN: CPT | Performed by: PHYSICIAN ASSISTANT

## 2018-12-14 RX ORDER — AZITHROMYCIN 250 MG/1
TABLET, FILM COATED ORAL
Qty: 6 TAB | Refills: 0 | Status: SHIPPED | OUTPATIENT
Start: 2018-12-14 | End: 2021-05-20

## 2018-12-17 ASSESSMENT — ENCOUNTER SYMPTOMS
SORE THROAT: 1
SPUTUM PRODUCTION: 0
SWOLLEN GLANDS: 1
EYE PAIN: 0
SHORTNESS OF BREATH: 0
NAUSEA: 0
VOMITING: 0
WHEEZING: 0
MYALGIAS: 0
DIZZINESS: 0
ABDOMINAL PAIN: 0
TROUBLE SWALLOWING: 0
DIARRHEA: 0
COUGH: 1
HEADACHES: 0
CHILLS: 0
FEVER: 0
CONSTIPATION: 0
BLURRED VISION: 0
PALPITATIONS: 0

## 2018-12-18 NOTE — PROGRESS NOTES
Subjective:      Melanie Bruno is a 28 y.o. female who presents with Sore Throat (sore, scratchy, irritated throat with dry cough x 3 days)      Pharyngitis    This is a new problem. The current episode started in the past 7 days (Symptoms started approximately 3 days ago). The problem has been waxing and waning. Neither side of throat is experiencing more pain than the other. There has been no fever. The pain is mild. Associated symptoms include congestion, coughing and swollen glands. Pertinent negatives include no abdominal pain, diarrhea, ear discharge, ear pain, headaches, plugged ear sensation, shortness of breath, trouble swallowing or vomiting. She has had no exposure to strep or mono. She has tried nothing for the symptoms.       Review of Systems   Constitutional: Positive for malaise/fatigue. Negative for chills and fever.   HENT: Positive for congestion and sore throat. Negative for ear discharge, ear pain and trouble swallowing.    Eyes: Negative for blurred vision and pain.   Respiratory: Positive for cough. Negative for sputum production, shortness of breath and wheezing.    Cardiovascular: Negative for chest pain and palpitations.   Gastrointestinal: Negative for abdominal pain, constipation, diarrhea, nausea and vomiting.   Musculoskeletal: Negative for myalgias.   Skin: Negative for rash.   Neurological: Negative for dizziness and headaches.       PMH:  has no past medical history on file.  MEDS:   Current Outpatient Prescriptions:   •  azithromycin (ZITHROMAX) 250 MG Tab, 500 mg in a single loading dose on day 1, followed by 250 mg once daily on days 2 to 5, Disp: 6 Tab, Rfl: 0  •  levonorgestrel-ethinyl estradiol (AVIANE, ALESSE, LESSINA) 0.1-20 MG-MCG per tablet, Take 1 Tab by mouth every day., Disp: 28 Tab, Rfl: 12  •  NON SPECIFIED, , Disp: , Rfl:   ALLERGIES:   Allergies   Allergen Reactions   • Bactrim Ds      Other reaction(s): Rash, Unspecified  Pt began noting rash after  "beginning septra ds for kidney infection. 12/2015   • Sulfa Drugs      SURGHX: History reviewed. No pertinent surgical history.  SOCHX:  reports that she has never smoked. She has never used smokeless tobacco. She reports that she drinks about 3.0 oz of alcohol per week . She reports that she does not use drugs.  FH: Family history was reviewed, no pertinent findings to report     Objective:     /68 (BP Location: Right arm, Patient Position: Sitting, BP Cuff Size: Adult)   Pulse 78   Temp 36.8 °C (98.3 °F) (Temporal)   Resp 16   Ht 1.676 m (5' 5.98\")   Wt 60.8 kg (134 lb)   SpO2 96%   BMI 21.64 kg/m²        Physical Exam   Constitutional: She is oriented to person, place, and time. She appears well-developed and well-nourished.   HENT:   Head: Normocephalic and atraumatic.   Right Ear: Tympanic membrane, external ear and ear canal normal.   Left Ear: Tympanic membrane, external ear and ear canal normal.   Nose: Nose normal.   Mouth/Throat: Uvula is midline and mucous membranes are normal. Posterior oropharyngeal erythema present.   Eyes: Pupils are equal, round, and reactive to light. Conjunctivae are normal.   Neck: Normal range of motion.   Cardiovascular: Normal rate, regular rhythm and normal heart sounds.    No murmur heard.  Pulmonary/Chest: Effort normal and breath sounds normal. No accessory muscle usage. No respiratory distress. She has no decreased breath sounds. She has no wheezes. She has no rhonchi. She has no rales.   Lymphadenopathy:     She has cervical adenopathy.   Neurological: She is alert and oriented to person, place, and time.   Skin: Skin is warm and dry. Capillary refill takes less than 2 seconds.   Psychiatric: She has a normal mood and affect. Her behavior is normal. Judgment normal.       POCT Rapid Strep A - Negative    Assessment/Plan:     1. Bronchitis  - azithromycin (ZITHROMAX) 250 MG Tab; 500 mg in a single loading dose on day 1, followed by 250 mg once daily on days 2 " to 5  Dispense: 6 Tab; Refill: 0    2. Pharyngitis, unspecified etiology  - POCT Rapid Strep A          Differential Diagnosis, natural history, and supportive care discussed. Return to the Urgent Care or follow up with your PCP if symptoms fail to resolve, or for any new or worsening symptoms. Emergency room precautions discussed. Patient and/or family appears understanding of information.

## 2020-06-07 NOTE — LETTER
January 3, 2018        Melanie Bruno  [address]  [place] [address]        Dear Melanie:    Sorry we missed you over the phone. We are calling to inform you that I reviewed your recent Tilt Table Test and it was positive as suspected for vasovagal syncope. I reviewed with you on your last office visit instructions and advice to avoid recurrent syncope. I've attached some educational handouts about it also.        If you have any questions or concerns, please don't hesitate to call our office, [phone].        Sincerely,        Oleksandr Rain M.D.    Electronically Signed      Initial (On Arrival)

## 2021-03-03 ENCOUNTER — TELEPHONE (OUTPATIENT)
Dept: SCHEDULING | Facility: IMAGING CENTER | Age: 31
End: 2021-03-03

## 2021-05-20 ENCOUNTER — OFFICE VISIT (OUTPATIENT)
Dept: MEDICAL GROUP | Facility: MEDICAL CENTER | Age: 31
End: 2021-05-20
Payer: COMMERCIAL

## 2021-05-20 VITALS
WEIGHT: 123 LBS | HEIGHT: 66 IN | SYSTOLIC BLOOD PRESSURE: 100 MMHG | OXYGEN SATURATION: 97 % | DIASTOLIC BLOOD PRESSURE: 64 MMHG | BODY MASS INDEX: 19.77 KG/M2 | HEART RATE: 65 BPM | TEMPERATURE: 98.5 F

## 2021-05-20 DIAGNOSIS — B07.0 PLANTAR WART: ICD-10-CM

## 2021-05-20 DIAGNOSIS — Z00.00 WELL ADULT EXAM: ICD-10-CM

## 2021-05-20 PROBLEM — R10.32 LEFT INGUINAL PAIN: Status: RESOLVED | Noted: 2018-02-27 | Resolved: 2021-05-20

## 2021-05-20 PROBLEM — R05.9 COUGH: Status: RESOLVED | Noted: 2018-02-27 | Resolved: 2021-05-20

## 2021-05-20 PROCEDURE — 99203 OFFICE O/P NEW LOW 30 MIN: CPT | Performed by: FAMILY MEDICINE

## 2021-05-20 RX ORDER — PHENAZOPYRIDINE HYDROCHLORIDE 200 MG/1
200 TABLET, FILM COATED ORAL
COMMUNITY
End: 2021-05-20

## 2021-05-20 RX ORDER — M-VIT,TX,IRON,MINS/CALC/FOLIC 27MG-0.4MG
1 TABLET ORAL DAILY
COMMUNITY

## 2021-05-20 RX ORDER — SULFAMETHOXAZOLE AND TRIMETHOPRIM 800; 160 MG/1; MG/1
TABLET ORAL
COMMUNITY
End: 2021-05-20

## 2021-05-20 ASSESSMENT — PATIENT HEALTH QUESTIONNAIRE - PHQ9: CLINICAL INTERPRETATION OF PHQ2 SCORE: 0

## 2021-05-20 NOTE — PATIENT INSTRUCTIONS
For plantar warts:  After taking bath/shower recommend gently filing the top layer of skin, then apply salicylic acid (over-the-counter Compound W or something similar) then cover with extra strength tape cut to size.  Pull off duct tape the next evening and repeat for a few weeks until warts resolve.

## 2021-05-20 NOTE — PROGRESS NOTES
"Subjective:     CC: Diagnoses of Plantar wart and Well adult exam were pertinent to this visit.    HPI: Patient is a 30 y.o. female new patient who presents today to establish care.  She is generally quite healthy, not on any medications.  No chronic medical history.      Plantar wart  Chronic problem, present for quite some time.  Tried one OTC treatment x1 with no improvement.  Present on the left medial heel.    Family planning  The patient and her  are thinking of getting pregnant.  Patient is requesting screening blood work.  Currently on multi vit.   Reports regular periods.   Patient is a   G0, P0  Due for Pap smear    Past Medical History:   Diagnosis Date   • Vaso vagal episode        Social History     Tobacco Use   • Smoking status: Never Smoker   • Smokeless tobacco: Never Used   Vaping Use   • Vaping Use: Never used   Substance Use Topics   • Alcohol use: Yes     Alcohol/week: 3.0 oz     Types: 2 Glasses of wine, 3 Standard drinks or equivalent per week     Comment: once or twice a week   • Drug use: No       Current Outpatient Medications Ordered in Epic   Medication Sig Dispense Refill   • therapeutic multivitamin-minerals (THERAGRAN-M) Tab Take 1 tablet by mouth every day.       No current [facility]-ordered facility-administered medications on file.       Allergies:  Bactrim ds and Sulfa drugs    Health Maintenance: Completed    ROS:  Gen: no fevers/chill, no changes in weight  Eyes: no changes in vision  ENT: no sore throat, no hearing loss, no bloody nose  Pulm: no sob, no cough  CV: no chest pain, no palpitations  GI: no nausea/vomiting, no diarrhea  : no dysuria  MSk: no myalgias  Skin: no rash  Neuro: no headaches, no numbness/tingling  Heme/Lymph: no easy bruising      Objective:       Exam:  /64 (BP Location: Right arm, Patient Position: Sitting, BP Cuff Size: Adult long)   Pulse 65   Temp 36.9 °C (98.5 °F) (Temporal)   Ht 1.676 m (5' 6\")   Wt 55.8 kg (123 lb)   " LMP 2021   SpO2 97%   BMI 19.85 kg/m²  Body mass index is 19.85 kg/m².      General: Normal appearing. No distress.  HEAD: NCAT  EYES: conjunctiva clear, lids without ptosis, pupils equal  and reactive to light  EARS: ears normal shape and contour, canals are clear bilaterally, TMs clear  MOUTH: Mask in place throughout exam.  Neck: Supple without masses. Thyroid is not enlarged. Normal ROM  Pulmonary: Clear to ausculation.  Normal effort. No rales, ronchi, or wheezing.  Cardiovascular: Regular rate and rhythm, no murmur. No LE edema  Neurologic: Grossly normal, no focal deficits  Lymph: No cervical or supraclavicular lymph nodes are palpable  Skin: Warm and dry.  No obvious lesions.  Musculoskeletal: Normal gait and station.   Psych: Normal mood and affect. Alert and oriented x3. Judgment and insight is normal.      Assessment & Plan:     30 y.o. female with the following -     1. Plantar wart  Problem.  Recommended home therapy including salicylic acid and duct tape.  Instructions can be found in the after visit summary.    2.  Family planning  , hoping to get pregnant in the somewhat near future.  Reports regular periods.  Due for Pap, we will get her scheduled for that.  Screening lab work ordered.  - Comp Metabolic Panel; Future  - Lipid Profile; Future  - CBC WITH DIFFERENTIAL; Future  - TSH WITH REFLEX TO FT4; Future      Return in about 1 week (around 2021) for Pap smear.    Please note that this dictation was created using voice recognition software. I have made every reasonable attempt to correct obvious errors, but I expect that there are errors of grammar and possibly content that I did not discover before finalizing the note.

## 2021-05-25 ENCOUNTER — HOSPITAL ENCOUNTER (OUTPATIENT)
Dept: LAB | Facility: MEDICAL CENTER | Age: 31
End: 2021-05-25
Attending: FAMILY MEDICINE
Payer: COMMERCIAL

## 2021-05-25 DIAGNOSIS — Z00.00 WELL ADULT EXAM: ICD-10-CM

## 2021-05-25 LAB
ALBUMIN SERPL BCP-MCNC: 4.2 G/DL (ref 3.2–4.9)
ALBUMIN/GLOB SERPL: 1.3 G/DL
ALP SERPL-CCNC: 61 U/L (ref 30–99)
ALT SERPL-CCNC: 30 U/L (ref 2–50)
ANION GAP SERPL CALC-SCNC: 8 MMOL/L (ref 7–16)
AST SERPL-CCNC: 28 U/L (ref 12–45)
BASOPHILS # BLD AUTO: 0.9 % (ref 0–1.8)
BASOPHILS # BLD: 0.06 K/UL (ref 0–0.12)
BILIRUB SERPL-MCNC: 0.8 MG/DL (ref 0.1–1.5)
BUN SERPL-MCNC: 11 MG/DL (ref 8–22)
CALCIUM SERPL-MCNC: 9.2 MG/DL (ref 8.5–10.5)
CHLORIDE SERPL-SCNC: 105 MMOL/L (ref 96–112)
CHOLEST SERPL-MCNC: 155 MG/DL (ref 100–199)
CO2 SERPL-SCNC: 23 MMOL/L (ref 20–33)
CREAT SERPL-MCNC: 0.76 MG/DL (ref 0.5–1.4)
EOSINOPHIL # BLD AUTO: 0.06 K/UL (ref 0–0.51)
EOSINOPHIL NFR BLD: 0.9 % (ref 0–6.9)
ERYTHROCYTE [DISTWIDTH] IN BLOOD BY AUTOMATED COUNT: 39.1 FL (ref 35.9–50)
FASTING STATUS PATIENT QL REPORTED: NORMAL
GLOBULIN SER CALC-MCNC: 3.2 G/DL (ref 1.9–3.5)
GLUCOSE SERPL-MCNC: 84 MG/DL (ref 65–99)
HCT VFR BLD AUTO: 43.2 % (ref 37–47)
HDLC SERPL-MCNC: 79 MG/DL
HGB BLD-MCNC: 14.6 G/DL (ref 12–16)
IMM GRANULOCYTES # BLD AUTO: 0.01 K/UL (ref 0–0.11)
IMM GRANULOCYTES NFR BLD AUTO: 0.2 % (ref 0–0.9)
LDLC SERPL CALC-MCNC: 64 MG/DL
LYMPHOCYTES # BLD AUTO: 2.42 K/UL (ref 1–4.8)
LYMPHOCYTES NFR BLD: 36.6 % (ref 22–41)
MCH RBC QN AUTO: 31.6 PG (ref 27–33)
MCHC RBC AUTO-ENTMCNC: 33.8 G/DL (ref 33.6–35)
MCV RBC AUTO: 93.5 FL (ref 81.4–97.8)
MONOCYTES # BLD AUTO: 0.61 K/UL (ref 0–0.85)
MONOCYTES NFR BLD AUTO: 9.2 % (ref 0–13.4)
NEUTROPHILS # BLD AUTO: 3.45 K/UL (ref 2–7.15)
NEUTROPHILS NFR BLD: 52.2 % (ref 44–72)
NRBC # BLD AUTO: 0 K/UL
NRBC BLD-RTO: 0 /100 WBC
PLATELET # BLD AUTO: 229 K/UL (ref 164–446)
PMV BLD AUTO: 10.7 FL (ref 9–12.9)
POTASSIUM SERPL-SCNC: 4.5 MMOL/L (ref 3.6–5.5)
PROT SERPL-MCNC: 7.4 G/DL (ref 6–8.2)
RBC # BLD AUTO: 4.62 M/UL (ref 4.2–5.4)
SODIUM SERPL-SCNC: 136 MMOL/L (ref 135–145)
TRIGL SERPL-MCNC: 62 MG/DL (ref 0–149)
TSH SERPL DL<=0.005 MIU/L-ACNC: 2.81 UIU/ML (ref 0.38–5.33)
WBC # BLD AUTO: 6.6 K/UL (ref 4.8–10.8)

## 2021-05-25 PROCEDURE — 80053 COMPREHEN METABOLIC PANEL: CPT

## 2021-05-25 PROCEDURE — 84443 ASSAY THYROID STIM HORMONE: CPT

## 2021-05-25 PROCEDURE — 80061 LIPID PANEL: CPT

## 2021-05-25 PROCEDURE — 36415 COLL VENOUS BLD VENIPUNCTURE: CPT

## 2021-05-25 PROCEDURE — 85025 COMPLETE CBC W/AUTO DIFF WBC: CPT

## 2021-05-28 ENCOUNTER — HOSPITAL ENCOUNTER (OUTPATIENT)
Facility: MEDICAL CENTER | Age: 31
End: 2021-05-28
Attending: FAMILY MEDICINE
Payer: COMMERCIAL

## 2021-05-28 ENCOUNTER — OFFICE VISIT (OUTPATIENT)
Dept: MEDICAL GROUP | Facility: MEDICAL CENTER | Age: 31
End: 2021-05-28
Payer: COMMERCIAL

## 2021-05-28 VITALS
SYSTOLIC BLOOD PRESSURE: 98 MMHG | DIASTOLIC BLOOD PRESSURE: 62 MMHG | TEMPERATURE: 98.7 F | OXYGEN SATURATION: 96 % | BODY MASS INDEX: 19.44 KG/M2 | HEART RATE: 87 BPM | WEIGHT: 121 LBS | HEIGHT: 66 IN

## 2021-05-28 DIAGNOSIS — Z12.4 CERVICAL CANCER SCREENING: ICD-10-CM

## 2021-05-28 DIAGNOSIS — Z00.00 WELL ADULT EXAM: ICD-10-CM

## 2021-05-28 LAB — AMBIGUOUS DTTM AMBI4: NORMAL

## 2021-05-28 PROCEDURE — 87591 N.GONORRHOEAE DNA AMP PROB: CPT

## 2021-05-28 PROCEDURE — 87491 CHLMYD TRACH DNA AMP PROBE: CPT

## 2021-05-28 PROCEDURE — 88175 CYTOPATH C/V AUTO FLUID REDO: CPT

## 2021-05-28 PROCEDURE — 99395 PREV VISIT EST AGE 18-39: CPT | Performed by: FAMILY MEDICINE

## 2021-05-28 ASSESSMENT — FIBROSIS 4 INDEX: FIB4 SCORE: 0.65

## 2021-05-28 NOTE — PROGRESS NOTES
Subjective:     CC:   Chief Complaint   Patient presents with   • Gynecologic Exam       HPI:   Melanie Luna is a 30 y.o. female who presents for annual exam. She is feeling well and denies any complaints.    Ob-Gyn/ History:    Patient has GYN provider: no  /Para:  0/0  Last Pap Smear:  .  No history of abnormal pap smears.  Gyn Surgery:  No.  Birth Control: none, using condoms.  Last menstrual period:  2 weeks ago.  Periods regular. moderate bleeding. Cramping is mild.   No significant bloating/fluid retention, pelvic pain, or dyspareunia. No vaginal discharge  Folate intake: yes   Sexually activity: yes, with   Dyspareunia: no    Health Maintenance  Diet: good   Exercise: yes, regular   Substance Abuse: denies any misuse   Safe in relationship.   STI Screening: n/a   HIV Screening: Has been checked in the past.   Immunizations:    Influenza: up to date    HPV:  Up to date    Tetanus: n/a    Pneumococcal : n/a          Seat belts, bike helmet, gun safety discussed.  Sun protection used.    Cancer screening  Breast cancer: + family history of breast cancer (maternal grandmother), no h/o uterine or ovarian cancer  Colorectal Cancer Screening: No family history of colon cancer.      She  has a past medical history of Vaso vagal episode.  She  has no past surgical history on file.    Family History   Problem Relation Age of Onset   • Thyroid Mother    • Depression Brother    • Stroke Maternal Grandmother    • Breast Cancer Maternal Grandmother    • Alzheimer's Disease Maternal Grandmother    • Dementia Paternal Grandmother    • Stroke Paternal Grandfather    • Diabetes Paternal Grandfather        Social History     Socioeconomic History   • Marital status: Single     Spouse name: Not on file   • Number of children: Not on file   • Years of education: Not on file   • Highest education level: Not on file   Occupational History   • Not on file   Tobacco Use   • Smoking status: Never Smoker    • Smokeless tobacco: Never Used   Vaping Use   • Vaping Use: Never used   Substance and Sexual Activity   • Alcohol use: Yes     Alcohol/week: 3.0 oz     Types: 2 Glasses of wine, 3 Standard drinks or equivalent per week     Comment: once or twice a week   • Drug use: No   • Sexual activity: Yes     Partners: Male     Birth control/protection: Condom   Other Topics Concern   • Not on file   Social History Narrative   • Not on file     Social Determinants of Health     Financial Resource Strain:    • Difficulty of Paying Living Expenses:    Food Insecurity:    • Worried About Running Out of Food in the Last Year:    • Ran Out of Food in the Last Year:    Transportation Needs:    • Lack of Transportation (Medical):    • Lack of Transportation (Non-Medical):    Physical Activity:    • Days of Exercise per Week:    • Minutes of Exercise per Session:    Stress:    • Feeling of Stress :    Social Connections:    • Frequency of Communication with Friends and Family:    • Frequency of Social Gatherings with Friends and Family:    • Attends Yarsanism Services:    • Active Member of Clubs or Organizations:    • Attends Club or Organization Meetings:    • Marital Status:    Intimate Partner Violence:    • Fear of Current or Ex-Partner:    • Emotionally Abused:    • Physically Abused:    • Sexually Abused:        Patient Active Problem List    Diagnosis Date Noted   • Plantar wart 05/20/2021   • Well adult exam 02/27/2018   • Temporomandibular joint disorder 03/10/2010         Current Outpatient Medications   Medication Sig Dispense Refill   • therapeutic multivitamin-minerals (THERAGRAN-M) Tab Take 1 tablet by mouth every day.       No current facility-administered medications for this visit.     Allergies   Allergen Reactions   • Bactrim Ds Rash     Other reaction(s): Rash, Unspecified  Pt began noting rash after beginning septra ds for kidney infection. 12/2015  Pt began noting rash after beginning septra ds for kidney  "infection. 12/2015   • Sulfa Drugs Rash       Review of Systems   Constitutional: Negative for fever, chills and malaise/fatigue.   HENT: Negative for congestion, sore throat, headache.    Eyes: Negative for double vision   Respiratory: Negative for cough and shortness of breath.    Cardiovascular: chest pain or palpitations  Gastrointestinal: Negative for nausea, vomiting, abdominal pain and diarrhea.   Genitourinary: Negative for dysuria and hematuria.   Skin: Negative for rash.   Neurological: Negative for dizziness, focal weakness and headaches.   Endo/Heme/Allergies: Does not bruise/bleed easily.   Psychiatric/Behavioral: Negative for depression or anxiety    Objective:     BP (!) 98/62 (BP Location: Right arm, Patient Position: Sitting, BP Cuff Size: Adult long)   Pulse 87   Temp 37.1 °C (98.7 °F) (Temporal)   Ht 1.676 m (5' 6\")   Wt 54.9 kg (121 lb)   LMP 05/07/2021   SpO2 96%   BMI 19.53 kg/m²   Body mass index is 19.53 kg/m².  Wt Readings from Last 4 Encounters:   05/28/21 54.9 kg (121 lb)   05/20/21 55.8 kg (123 lb)   12/14/18 60.8 kg (134 lb)   02/27/18 58.2 kg (128 lb 6.4 oz)       Physical Exam:  Constitutional: Well-developed and well-nourished. Not diaphoretic. No distress.   Skin: Skin is warm and dry. No rash noted.  Eyes: Conjunctivae and extraocular motions are normal. Pupils are equal, round, and reactive to light. No scleral icterus.   Nose: Nares patent. No discharge.   Mouth/Throat: Mask in place throughout exam.   Neck: Supple, trachea midline. Normal range of motion. No thyromegaly present. No lymphadenopathy--cervical or supraclavicular.  Breast: Breasts examined seated and supine. No skin changes, peau d'orange or nipple retraction. No discharge. No axillary or supraclavicular adenopathy. No masses or nodularity palpable.   Abdomen: Soft, non tender, and without distention. Active bowel sounds in all four quadrants. No rebound, guarding, masses or HSM.  :Perineum and external " genitalia normal without rash. Vagina with normal and physiologic discharge. Cervix without visible lesions or discharge. Bimanual exam without adnexal masses or cervical motion tenderness.  Extremities: No cyanosis, clubbing, erythema, nor edema. Distal pulses intact and symmetric.   Musculoskeletal: Normal gait and station.  Neurological: No focal deficits  Psychiatric:  Behavior, mood, and affect are appropriate.    Assessment and Plan:     1. Well adult exam     2. Cervical cancer screening  THINPREP RFLX HPV ASC AND ABOVE W/CTNG     Reviewed labs with patient today, all looked great.     HCM:     Labs per orders  Immunizations per orders  Patient counseled about skin care, diet, supplements, prenatal vitamins, safe sex and exercise.    Follow-up: Return in about 1 year (around 5/28/2022).    Please note that this dictation was created using voice recognition software. I have made every reasonable attempt to correct obvious errors, but I expect that there are errors of grammar and possibly content that I did not discover before finalizing the note.

## 2021-06-02 LAB
C TRACH DNA GENITAL QL NAA+PROBE: NEGATIVE
CYTOLOGY REG CYTOL: NORMAL
N GONORRHOEA DNA GENITAL QL NAA+PROBE: NEGATIVE
SPECIMEN SOURCE: NORMAL

## 2022-04-20 ENCOUNTER — TELEMEDICINE (OUTPATIENT)
Dept: MEDICAL GROUP | Facility: MEDICAL CENTER | Age: 32
End: 2022-04-20
Payer: COMMERCIAL

## 2022-04-20 VITALS — BODY MASS INDEX: 19.61 KG/M2 | TEMPERATURE: 98 F | HEIGHT: 66 IN | WEIGHT: 122 LBS

## 2022-04-20 DIAGNOSIS — R05.9 COUGH: ICD-10-CM

## 2022-04-20 PROCEDURE — 99213 OFFICE O/P EST LOW 20 MIN: CPT | Mod: 95 | Performed by: PHYSICIAN ASSISTANT

## 2022-04-20 RX ORDER — CEPHALEXIN 500 MG/1
500 CAPSULE ORAL 3 TIMES DAILY
Qty: 21 CAPSULE | Refills: 0 | Status: SHIPPED | OUTPATIENT
Start: 2022-04-20 | End: 2022-04-27

## 2022-04-20 ASSESSMENT — FIBROSIS 4 INDEX: FIB4 SCORE: 0.67

## 2022-04-20 NOTE — PROGRESS NOTES
Virtual Visit: Established Patient   This visit was conducted via Zoom using secure and encrypted videoconferencing technology.   The patient was in their home in the state of [place].    The patient's identity was confirmed and verbal consent was obtained for this virtual visit.    Subjective:   CC:   Chief Complaint   Patient presents with   • Cough   • Nasal Congestion   • Sinus Problem     Melanie Luna is a 31 y.o. female presenting for evaluation and management of:    Cough  Sick since last Monday. Sore throat and sneezing. Nasal drainage, fatigue. Now more of a cough and congestion. Two negative tests for covid. No fever/chills. No known sick exposure. No travel. So far using mucinex last week and advil sinus. Now just using cough drops. Sore throat is better. Sneezing and nasal drip is getting better. Cough is getting more because of post nasal drainage. Currently not using nasal sprays. No h/o lung disease. Sometimes gets bronchitis. No pain when she coughs, no wheezing, gets tired easily. No sinus pain or ear pain currently. Worried because it has lasted 10 days      ROS no fever/chills. No headache/dizziness. No neck pain or stiffness. No chest pain, SOB or difficulty breathing. No n/v/d/c or abdominal pain. No rash or skin lesion. No urinary symptoms. No joint pain and redness      Current medicines (including changes today)  Current Outpatient Medications   Medication Sig Dispense Refill   • cephALEXin (KEFLEX) 500 MG Cap Take 1 Capsule by mouth 3 times a day for 7 days. 21 Capsule 0   • therapeutic multivitamin-minerals (THERAGRAN-M) Tab Take 1 tablet by mouth every day.       No current facility-administered medications for this visit.       Patient Active Problem List    Diagnosis Date Noted   • Plantar wart 05/20/2021   • Well adult exam 02/27/2018   • Cough 02/27/2018   • Temporomandibular joint disorder 03/10/2010        Objective:   Temp 36.7 °C (98 °F) Comment: pt. reported  Ht 1.676 m  "(5' 6\") Comment: pt. reported  Wt 55.3 kg (122 lb) Comment: pt. reported  LMP 04/04/2022   BMI 19.69 kg/m²     Physical Exam:  Constitutional: Alert, no distress, well-groomed.  Skin: No rashes in visible areas.  Eye: Round. Conjunctiva clear, lids normal. No icterus.   ENMT: Lips pink without lesions, good dentition, moist mucous membranes. Phonation normal.  Neck: No masses, no thyromegaly. Moves freely without pain.  Respiratory: Unlabored respiratory effort, no cough or audible wheeze  Psych: Alert and oriented x3, normal affect and mood.     Assessment and Plan:   The following treatment plan was discussed:     1. Cough  - cephALEXin (KEFLEX) 500 MG Cap; Take 1 Capsule by mouth 3 times a day for 7 days.  Dispense: 21 Capsule; Refill: 0    Likely viral URI, keflex if symptoms persist past 2 week carline    Follow-up: with PCP as needed         "

## 2022-04-20 NOTE — ASSESSMENT & PLAN NOTE
Sick since last Monday. Sore throat and sneezing. Nasal drainage, fatigue. Now more of a cough and congestion. Two negative tests for covid. No fever/chills. No known sick exposure. No travel. So far using mucinex last week and advil sinus. Now just using cough drops. Sore throat is better. Sneezing and nasal drip is getting better. Cough is getting more because of post nasal drainage. Currently not using nasal sprays. No h/o lung disease. Sometimes gets bronchitis. No pain when she coughs, no wheezing, gets tired easily. No sinus pain or ear pain currently. Worried because it has lasted 10 days

## 2022-09-13 ENCOUNTER — OFFICE VISIT (OUTPATIENT)
Dept: MEDICAL GROUP | Facility: MEDICAL CENTER | Age: 32
End: 2022-09-13
Payer: COMMERCIAL

## 2022-09-13 VITALS
TEMPERATURE: 98.9 F | BODY MASS INDEX: 19.61 KG/M2 | HEART RATE: 64 BPM | SYSTOLIC BLOOD PRESSURE: 112 MMHG | DIASTOLIC BLOOD PRESSURE: 64 MMHG | HEIGHT: 66 IN | WEIGHT: 122 LBS | OXYGEN SATURATION: 98 %

## 2022-09-13 DIAGNOSIS — Z30.09 FAMILY PLANNING COUNSELING: ICD-10-CM

## 2022-09-13 DIAGNOSIS — R10.32 LLQ PAIN: ICD-10-CM

## 2022-09-13 DIAGNOSIS — Z31.69 INFERTILITY COUNSELING: ICD-10-CM

## 2022-09-13 LAB
INT CON NEG: NEGATIVE
INT CON POS: POSITIVE
POC URINE PREGNANCY TEST: NEGATIVE

## 2022-09-13 PROCEDURE — 99214 OFFICE O/P EST MOD 30 MIN: CPT | Performed by: FAMILY MEDICINE

## 2022-09-13 PROCEDURE — 81025 URINE PREGNANCY TEST: CPT | Performed by: FAMILY MEDICINE

## 2022-09-13 ASSESSMENT — FIBROSIS 4 INDEX: FIB4 SCORE: 0.69

## 2022-09-13 ASSESSMENT — PATIENT HEALTH QUESTIONNAIRE - PHQ9: CLINICAL INTERPRETATION OF PHQ2 SCORE: 0

## 2022-09-13 NOTE — PROGRESS NOTES
"Subjective:     CC: Diagnoses of Family planning counseling, LLQ pain, and Infertility counseling were pertinent to this visit.    HPI: Patient is a 32 y.o. female established patient who presents today to discuss the following.    Irregular menses  Monitoring cyles  Cycle length on average is 28 days for years.  Very regular.  However, currently trying to get pregnant, had 45 day cycle recently, took a few pregnancy tests which were all negative.   Has been actively trying for pregnancy x 10 months  Had pelvic u/s in 2018 for LLQ pain, had 4.6cm cyst. No other findings.   She does note some tenderness and fullness in the left lower quadrant.  She is concerned she could have another ovarian cyst.  She does report that she ran a half marathon during that elongated cycle.    No problem-specific Assessment & Plan notes found for this encounter.      Past Medical History:   Diagnosis Date    Vaso vagal episode        Social History     Tobacco Use    Smoking status: Never    Smokeless tobacco: Never   Vaping Use    Vaping Use: Never used   Substance Use Topics    Alcohol use: Yes     Alcohol/week: 3.0 oz     Types: 2 Glasses of wine, 3 Standard drinks or equivalent per week     Comment: once or twice a week    Drug use: No       Current Outpatient Medications Ordered in Epic   Medication Sig Dispense Refill    therapeutic multivitamin-minerals (THERAGRAN-M) Tab Take 1 tablet by mouth every day.       No current [facility]-ordered facility-administered medications on file.       Allergies:  Bactrim ds and Sulfa drugs    Health Maintenance: Up-to-date on Pap smear    Objective:       Exam:  /64 (BP Location: Left arm, Patient Position: Sitting, BP Cuff Size: Adult long)   Pulse 64   Temp 37.2 °C (98.9 °F) (Temporal)   Ht 1.676 m (5' 6\")   Wt 55.3 kg (122 lb)   LMP 09/10/2022 (Exact Date)   SpO2 98%   BMI 19.69 kg/m²  Body mass index is 19.69 kg/m².      General: Normal appearing. No distress.  HEAD: NCAT  EYES: " conjunctiva clear, lids without ptosis, pupils equal  and reactive to light  EARS: ears normal shape and contour, canals are clear bilaterally, TMs clear  Neck: Supple without masses. Thyroid is not enlarged. Normal ROM  Pulmonary: Clear to ausculation.  Normal effort. No rales, ronchi, or wheezing.  Cardiovascular: Regular rate and rhythm, no murmur. No LE edema  Neurologic: Grossly normal, no focal deficits  Lymph: No cervical or supraclavicular lymph nodes are palpable  Skin: Warm and dry.  No obvious lesions.  Musculoskeletal: Normal gait and station.   Psych: Normal mood and affect. Alert and oriented x3. Judgment and insight is normal.       Labs: 5/25/2021 results reviewed and discussed with the patient, questions answered.    Assessment & Plan:     32 y.o. female with the following -     1. Family planning counseling  - POCT Pregnancy    2. LLQ pain  - US-PELVIC COMPLETE (TRANSABDOMINAL/TRANSVAGINAL) (COMBO); Future    3. Infertility counseling  - Referral to Infertility     Pt has been trying to get pregnant with her spouse for the last 10 months.  Unfortunate, they have been unsuccessful.  She does have some tenderness and fullness in the left lower quadrant, ultrasound ordered, history of 4.5 cm cyst on the left ovary.  She did have 1 irregular cycle but prior to that has had extremely regular cycles, 28 days for years.  Referral started for infertility specialist given that its been nearly a year.  Reviewed labs done last year including thyroid which was normal.    Return if symptoms worsen or fail to improve.    Please note that this dictation was created using voice recognition software. I have made every reasonable attempt to correct obvious errors, but I expect that there are errors of grammar and possibly content that I did not discover before finalizing the note.

## 2024-02-06 NOTE — LETTER
April 20, 2022        RE: Laureen Luna        Patient seen for symptoms of upper respiratory infection. Please excuse from time missed at work last week.            Thank you for your time,            Josee Dewitt P.A.-C.       Food in the Last Year: Never true     Ran Out of Food in the Last Year: Never true   Transportation Needs: No Transportation Needs (2/6/2024)    PRAPARE - Transportation     Lack of Transportation (Medical): No     Lack of Transportation (Non-Medical): No   Physical Activity: Not on file   Stress: Not on file   Social Connections: Not on file   Intimate Partner Violence: Not on file   Housing Stability: Low Risk  (2/6/2024)    Housing Stability Vital Sign     Unable to Pay for Housing in the Last Year: No     Number of Places Lived in the Last Year: 1     Unstable Housing in the Last Year: No     ROS: As above.  Labs:  CBC:   Recent Labs     02/05/24  1350 02/06/24  0319   WBC 8.9 10.2   HGB 12.8 11.4*   HCT 39.6 35.3*   MCV 91.7 91.2    363     BMP:   Recent Labs     02/05/24  1350 02/06/24 0319    139   K 3.4* 3.9    102   CO2 29 22*   BUN 13 12   CREATININE 0.8 0.8   MG 1.6 2.1     Accucheck Glucoses: No results for input(s): \"POCGLU\" in the last 72 hours.  Cardiac Enzymes: No results for input(s): \"CKTOTAL\", \"CKMB\", \"CKMBINDEX\", \"TROPONINI\" in the last 72 hours.  PT/INR:   Recent Labs     02/05/24 2039   INR 1.14*     APTT:   Recent Labs     02/05/24 2039   APTT 34.3     Liver Profile:  Lab Results   Component Value Date/Time    ALT 23 08/13/2011 11:51 AM     Lab Results   Component Value Date/Time    CHOL 203 05/09/2013 11:11 AM    HDL 59 05/09/2013 11:11 AM    TRIG 150 05/09/2013 11:11 AM     TSH:   Lab Results   Component Value Date/Time    TSH 2.010 02/05/2024 01:50 PM     UA: No results found for: \"NITRITE\", \"COLORU\", \"PHUR\", \"LABCAST\", \"WBCUA\", \"RBCUA\", \"MUCUS\", \"TRICHOMONAS\", \"YEAST\", \"BACTERIA\", \"CLARITYU\", \"SPECGRAV\", \"LEUKOCYTESUR\", \"UROBILINOGEN\", \"BILIRUBINUR\", \"BLOODU\", \"GLUCOSEU\", \"AMORPHOUS\"      Physical Exam:  Vitals:    02/06/24 0630   BP: 105/85   Pulse: 97   Resp: 16   Temp: 98.1 °F (36.7 °C)   SpO2: 91%      Intake/Output Summary (Last 24 hours) at 2/6/2024 0856  Last

## 2024-11-05 SDOH — ECONOMIC STABILITY: INCOME INSECURITY: IN THE LAST 12 MONTHS, WAS THERE A TIME WHEN YOU WERE NOT ABLE TO PAY THE MORTGAGE OR RENT ON TIME?: NO

## 2024-11-05 SDOH — ECONOMIC STABILITY: HOUSING INSECURITY
IN THE LAST 12 MONTHS, WAS THERE A TIME WHEN YOU DID NOT HAVE A STEADY PLACE TO SLEEP OR SLEPT IN A SHELTER (INCLUDING NOW)?: NO

## 2024-11-05 SDOH — ECONOMIC STABILITY: FOOD INSECURITY: WITHIN THE PAST 12 MONTHS, YOU WORRIED THAT YOUR FOOD WOULD RUN OUT BEFORE YOU GOT MONEY TO BUY MORE.: NEVER TRUE

## 2024-11-05 SDOH — ECONOMIC STABILITY: TRANSPORTATION INSECURITY
IN THE PAST 12 MONTHS, HAS THE LACK OF TRANSPORTATION KEPT YOU FROM MEDICAL APPOINTMENTS OR FROM GETTING MEDICATIONS?: NO

## 2024-11-05 SDOH — ECONOMIC STABILITY: TRANSPORTATION INSECURITY
IN THE PAST 12 MONTHS, HAS LACK OF TRANSPORTATION KEPT YOU FROM MEETINGS, WORK, OR FROM GETTING THINGS NEEDED FOR DAILY LIVING?: NO

## 2024-11-05 SDOH — ECONOMIC STABILITY: INCOME INSECURITY: HOW HARD IS IT FOR YOU TO PAY FOR THE VERY BASICS LIKE FOOD, HOUSING, MEDICAL CARE, AND HEATING?: NOT VERY HARD

## 2024-11-05 SDOH — ECONOMIC STABILITY: TRANSPORTATION INSECURITY
IN THE PAST 12 MONTHS, HAS LACK OF RELIABLE TRANSPORTATION KEPT YOU FROM MEDICAL APPOINTMENTS, MEETINGS, WORK OR FROM GETTING THINGS NEEDED FOR DAILY LIVING?: NO

## 2024-11-05 SDOH — HEALTH STABILITY: PHYSICAL HEALTH: ON AVERAGE, HOW MANY MINUTES DO YOU ENGAGE IN EXERCISE AT THIS LEVEL?: 30 MIN

## 2024-11-05 SDOH — ECONOMIC STABILITY: FOOD INSECURITY: WITHIN THE PAST 12 MONTHS, THE FOOD YOU BOUGHT JUST DIDN'T LAST AND YOU DIDN'T HAVE MONEY TO GET MORE.: NEVER TRUE

## 2024-11-05 SDOH — HEALTH STABILITY: PHYSICAL HEALTH: ON AVERAGE, HOW MANY DAYS PER WEEK DO YOU ENGAGE IN MODERATE TO STRENUOUS EXERCISE (LIKE A BRISK WALK)?: 4 DAYS

## 2024-11-05 SDOH — HEALTH STABILITY: MENTAL HEALTH
STRESS IS WHEN SOMEONE FEELS TENSE, NERVOUS, ANXIOUS, OR CAN'T SLEEP AT NIGHT BECAUSE THEIR MIND IS TROUBLED. HOW STRESSED ARE YOU?: VERY MUCH

## 2024-11-05 ASSESSMENT — LIFESTYLE VARIABLES
HOW OFTEN DO YOU HAVE A DRINK CONTAINING ALCOHOL: NEVER
HOW OFTEN DO YOU HAVE SIX OR MORE DRINKS ON ONE OCCASION: NEVER
AUDIT-C TOTAL SCORE: 0
HOW MANY STANDARD DRINKS CONTAINING ALCOHOL DO YOU HAVE ON A TYPICAL DAY: PATIENT DOES NOT DRINK
SKIP TO QUESTIONS 9-10: 1

## 2024-11-05 ASSESSMENT — SOCIAL DETERMINANTS OF HEALTH (SDOH)
IN THE PAST 12 MONTHS, HAS THE ELECTRIC, GAS, OIL, OR WATER COMPANY THREATENED TO SHUT OFF SERVICE IN YOUR HOME?: NO
DO YOU BELONG TO ANY CLUBS OR ORGANIZATIONS SUCH AS CHURCH GROUPS UNIONS, FRATERNAL OR ATHLETIC GROUPS, OR SCHOOL GROUPS?: NO
IN A TYPICAL WEEK, HOW MANY TIMES DO YOU TALK ON THE PHONE WITH FAMILY, FRIENDS, OR NEIGHBORS?: MORE THAN THREE TIMES A WEEK
HOW OFTEN DO YOU HAVE A DRINK CONTAINING ALCOHOL: NEVER
HOW OFTEN DO YOU HAVE SIX OR MORE DRINKS ON ONE OCCASION: NEVER
HOW OFTEN DO YOU ATTEND CHURCH OR RELIGIOUS SERVICES?: NEVER
DO YOU BELONG TO ANY CLUBS OR ORGANIZATIONS SUCH AS CHURCH GROUPS UNIONS, FRATERNAL OR ATHLETIC GROUPS, OR SCHOOL GROUPS?: NO
HOW OFTEN DO YOU GET TOGETHER WITH FRIENDS OR RELATIVES?: PATIENT DECLINED
HOW MANY DRINKS CONTAINING ALCOHOL DO YOU HAVE ON A TYPICAL DAY WHEN YOU ARE DRINKING: PATIENT DOES NOT DRINK
WITHIN THE PAST 12 MONTHS, YOU WORRIED THAT YOUR FOOD WOULD RUN OUT BEFORE YOU GOT THE MONEY TO BUY MORE: NEVER TRUE
HOW OFTEN DO YOU ATTENT MEETINGS OF THE CLUB OR ORGANIZATION YOU BELONG TO?: PATIENT DECLINED
HOW HARD IS IT FOR YOU TO PAY FOR THE VERY BASICS LIKE FOOD, HOUSING, MEDICAL CARE, AND HEATING?: NOT VERY HARD
IN A TYPICAL WEEK, HOW MANY TIMES DO YOU TALK ON THE PHONE WITH FAMILY, FRIENDS, OR NEIGHBORS?: MORE THAN THREE TIMES A WEEK
HOW OFTEN DO YOU ATTENT MEETINGS OF THE CLUB OR ORGANIZATION YOU BELONG TO?: PATIENT DECLINED
HOW OFTEN DO YOU ATTEND CHURCH OR RELIGIOUS SERVICES?: NEVER
HOW OFTEN DO YOU GET TOGETHER WITH FRIENDS OR RELATIVES?: PATIENT DECLINED

## 2024-11-06 ENCOUNTER — OFFICE VISIT (OUTPATIENT)
Dept: MEDICAL GROUP | Facility: LAB | Age: 34
End: 2024-11-06
Payer: COMMERCIAL

## 2024-11-06 ENCOUNTER — PATIENT MESSAGE (OUTPATIENT)
Dept: MEDICAL GROUP | Facility: LAB | Age: 34
End: 2024-11-06

## 2024-11-06 VITALS
DIASTOLIC BLOOD PRESSURE: 68 MMHG | WEIGHT: 126 LBS | SYSTOLIC BLOOD PRESSURE: 100 MMHG | TEMPERATURE: 97.6 F | BODY MASS INDEX: 20.25 KG/M2 | HEIGHT: 66 IN | HEART RATE: 88 BPM | OXYGEN SATURATION: 99 % | RESPIRATION RATE: 20 BRPM

## 2024-11-06 DIAGNOSIS — F41.0 PANIC ATTACK: ICD-10-CM

## 2024-11-06 DIAGNOSIS — F41.9 ANXIETY: ICD-10-CM

## 2024-11-06 DIAGNOSIS — Z23 NEED FOR VACCINATION: ICD-10-CM

## 2024-11-06 DIAGNOSIS — F33.1 MODERATE EPISODE OF RECURRENT MAJOR DEPRESSIVE DISORDER (HCC): ICD-10-CM

## 2024-11-06 PROBLEM — N97.9 INFERTILITY, FEMALE: Status: ACTIVE | Noted: 2024-11-06

## 2024-11-06 PROBLEM — K90.41 GLUTEN INTOLERANCE: Status: ACTIVE | Noted: 2024-11-06

## 2024-11-06 PROCEDURE — 3078F DIAST BP <80 MM HG: CPT | Performed by: STUDENT IN AN ORGANIZED HEALTH CARE EDUCATION/TRAINING PROGRAM

## 2024-11-06 PROCEDURE — 3074F SYST BP LT 130 MM HG: CPT | Performed by: STUDENT IN AN ORGANIZED HEALTH CARE EDUCATION/TRAINING PROGRAM

## 2024-11-06 PROCEDURE — 99214 OFFICE O/P EST MOD 30 MIN: CPT | Mod: 25 | Performed by: STUDENT IN AN ORGANIZED HEALTH CARE EDUCATION/TRAINING PROGRAM

## 2024-11-06 PROCEDURE — 90472 IMMUNIZATION ADMIN EACH ADD: CPT | Performed by: STUDENT IN AN ORGANIZED HEALTH CARE EDUCATION/TRAINING PROGRAM

## 2024-11-06 PROCEDURE — 90471 IMMUNIZATION ADMIN: CPT | Performed by: STUDENT IN AN ORGANIZED HEALTH CARE EDUCATION/TRAINING PROGRAM

## 2024-11-06 PROCEDURE — 90715 TDAP VACCINE 7 YRS/> IM: CPT | Performed by: STUDENT IN AN ORGANIZED HEALTH CARE EDUCATION/TRAINING PROGRAM

## 2024-11-06 PROCEDURE — 90656 IIV3 VACC NO PRSV 0.5 ML IM: CPT | Performed by: STUDENT IN AN ORGANIZED HEALTH CARE EDUCATION/TRAINING PROGRAM

## 2024-11-06 RX ORDER — HYDROXYZINE HYDROCHLORIDE 10 MG/1
10 TABLET, FILM COATED ORAL EVERY 6 HOURS PRN
COMMUNITY
Start: 2024-11-04

## 2024-11-06 ASSESSMENT — ENCOUNTER SYMPTOMS
DEPRESSION: 1
INSOMNIA: 1
NERVOUS/ANXIOUS: 1
FEVER: 0
ABDOMINAL PAIN: 0
WEIGHT LOSS: 0
DIARRHEA: 0
COUGH: 0
CHILLS: 0
CONSTIPATION: 0
NAUSEA: 0
SHORTNESS OF BREATH: 0
VOMITING: 0
BLOOD IN STOOL: 0

## 2024-11-06 ASSESSMENT — ANXIETY QUESTIONNAIRES
GAD7 TOTAL SCORE: 16
7. FEELING AFRAID AS IF SOMETHING AWFUL MIGHT HAPPEN: SEVERAL DAYS
3. WORRYING TOO MUCH ABOUT DIFFERENT THINGS: MORE THAN HALF THE DAYS
1. FEELING NERVOUS, ANXIOUS, OR ON EDGE: NEARLY EVERY DAY
5. BEING SO RESTLESS THAT IT IS HARD TO SIT STILL: MORE THAN HALF THE DAYS
4. TROUBLE RELAXING: NEARLY EVERY DAY
2. NOT BEING ABLE TO STOP OR CONTROL WORRYING: NEARLY EVERY DAY
6. BECOMING EASILY ANNOYED OR IRRITABLE: MORE THAN HALF THE DAYS

## 2024-11-06 ASSESSMENT — PATIENT HEALTH QUESTIONNAIRE - PHQ9
CLINICAL INTERPRETATION OF PHQ2 SCORE: 5
5. POOR APPETITE OR OVEREATING: 3 - NEARLY EVERY DAY
SUM OF ALL RESPONSES TO PHQ QUESTIONS 1-9: 17

## 2024-11-06 ASSESSMENT — LIFESTYLE VARIABLES: SUBSTANCE_ABUSE: 0

## 2024-11-06 NOTE — LETTER
[facility]  [id number]     November 6, 2024    Patient: Melanie Luna   YOB: 1990   Date of Visit: 11/6/2024       To Whom It May Concern:    Melanie Luna was seen and treated in our department on 11/6/2024.     Patient would benefit from leave of absence for up to 12 weeks for acute exacerbation of chronic condition.  Patient has a referral for treatment with specialist and medication to use as needed in the interim.    Sincerely,       Verena Pal D.O.

## 2024-11-06 NOTE — PROGRESS NOTES
Subjective:     Chief Complaint   Patient presents with    Establish Care     HISTORY OF THE PRESENT ILLNESS: Patient is a 34 y.o. female. This pleasant patient is here today to establish care and discuss mood issues. His/her prior PCP was Dr. Geetha Gallagher (last visit 9/2022) and following that was using [facility].    Verbal consent was acquired by the patient to use [employer] ambient listening note generation during this visit Yes.    History of Present Illness  The patient is a 34-year-old female here to establish care.    She has been experiencing severe anxiety and depression, which have been long-standing issues. Her anxiety has been particularly intense over the past three years due to work-related stress. She had a severe panic attack two weeks ago.  She was referred to a specialist for her anxiety and depression in May 2024, but due to work commitments, she was unable to attend. She is currently not on any medication for her anxiety and depression. She was previously prescribed hydroxyzine 10mg for her panic attacks, but she has not taken it yet. Prefers to avoid medication and taking a 8-12 week leave of absence to work on her mental health.     She exercises regularly typically and this helps her mood, but there was a period work was too time consuming and her exercise decreased.     She has been trying to conceive for the past three years and has undergone fertility treatments, including two rounds of IUI at a fertility clinic. She has irregular periods and has been seeing an OB/GYN for this issue. She was on thyroid medication for fertility, but it caused significant side effects, so she stopped taking it. She has been experiencing episodes of depression for the past two years, which she believes are related to her infertility. She has never been pregnant. She has been experiencing a buzzing sensation in her body since starting hormone therapy at the fertility clinic, which worsens during ovulation  "and her menstrual cycle. She is not currently on hormones and feels better off them. Her last Pap smear was done by her fertility specialist.    She has been dealing with gut health issues, including parasites and E. coli, which she believes she contracted in [place]. She was referred to a gastroenterologist but did not follow up. She has been seeing a holistic medicine practitioner who has been conducting stool tests. Her last test was normal, but she was found to have a sensitivity to gluten, so she has cut it out of her diet and noticed an improvement.    She has been diagnosed with endometritis following EMB and was treated with doxycycline for two weeks in July 2024.    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 She does not smoke or vape. She does not drink alcohol. She denies any drug use now or in the past.    Health Maintenance: Will request PAP record.    Review of Systems   Constitutional:  Positive for malaise/fatigue. Negative for chills, fever and weight loss.   Respiratory:  Negative for cough and shortness of breath.    Cardiovascular:  Negative for chest pain.   Gastrointestinal:  Negative for abdominal pain, blood in stool, constipation, diarrhea, nausea and vomiting.   Skin:  Negative for rash.   Psychiatric/Behavioral:  Positive for depression. Negative for substance abuse and suicidal ideas. The patient is nervous/anxious and has insomnia.          Objective:     Exam: /68 (BP Location: Right arm, Patient Position: Sitting, BP Cuff Size: Adult)   Pulse 88   Temp 36.4 °C (97.6 °F) (Temporal)   Resp 20   Ht 1.676 m (5' 6\")   Wt 57.2 kg (126 lb)   SpO2 99%  Body mass index is 20.34 kg/m².    Physical Exam  Vitals reviewed.   Constitutional:       General: She is not in acute distress.     Appearance: Normal appearance. She is not ill-appearing.   HENT:      Head: Normocephalic and atraumatic.      Nose: Nose normal.      Mouth/Throat:      Mouth: Mucous membranes are moist. "   Eyes:      General: No scleral icterus.  Cardiovascular:      Rate and Rhythm: Normal rate and regular rhythm.      Heart sounds: Normal heart sounds.   Pulmonary:      Effort: Pulmonary effort is normal.      Breath sounds: Normal breath sounds.   Abdominal:      General: Abdomen is flat. Bowel sounds are normal. There is no distension.      Palpations: Abdomen is soft. There is no mass.      Tenderness: There is no abdominal tenderness. There is no guarding or rebound.   Skin:     General: Skin is warm and dry.      Coloration: Skin is not jaundiced.   Neurological:      General: No focal deficit present.      Mental Status: She is alert and oriented to person, place, and time.   Psychiatric:      Comments:   Appearance: Clothing and grooming normal.  Mood: anxious and depressed  Behavior: No psychomotor abnormalities or impulsivity. Attention is good. Patient is pleasant and cooperative.   Eye contact: good   Affect: mood-congruent  Speech/thought content: Normal speech pattern. Normal insight and reasoning, no evidence of psychotic process. Denies suicidal or homicidal thoughts.        Labs: Reviewed from 5/25/2021  Imaging: Reviewed from 2/23/2018    KENYATTA-7 Questionnaire    Feeling nervous, anxious, or on edge: Nearly every day  Not being able to sop or control worrying: Nearly every day  Worrying too much about different things: More than half the days  Trouble relaxing: Nearly every day  Being so restless that it's hard to sit still: More than half the days  Becoming easily annoyed or irritable: More than half the days  Feeling afraid as if something awful might happen: Several days  Total: 16    Interpretation of KENYATTA 7 Total Score   Score Severity :  0-4 No Anxiety   5-9 Mild Anxiety  10-14 Moderate Anxiety  15-21 Severe Anxiety    Depression Screening    Little interest or pleasure in doing things?  2 - more than half the days   Feeling down, depressed , or hopeless? 3 - nearly every day   Trouble falling  or staying asleep, or sleeping too much?  2 - more than half the days   Feeling tired or having little energy?  3 - nearly every day   Poor appetite or overeating?  3 - nearly every day   Feeling bad about yourself - or that you are a failure or have let yourself or your family down? 2 - more than half the days   Trouble concentrating on things, such as reading the newspaper or watching television? 1 - several days   Moving or speaking so slowly that other people could have noticed.  Or the opposite - being so fidgety or restless that you have been moving around a lot more than usual?  1 - several days   Thoughts that you would be better off dead, or of hurting yourself?  0 - not at all   Patient Health Questionnaire Score: 17       If depressive symptoms identified deferred to follow up visit unless specifically addressed in assesment and plan.    Interpretation of PHQ-9 Total Score   Score Severity   1-4 No Depression   5-9 Mild Depression   10-14 Moderate Depression   15-19 Moderately Severe Depression   20-27 Severe Depression      Assessment & Plan:   34 y.o. female with the following -    1. Anxiety  2. Panic attack  3. Moderate episode of recurrent major depressive disorder (HCC)  Chronic, worse recently. The patient reports experiencing severe anxiety and a recent panic attack. She has a history of anxiety since childhood, exacerbated over the past three years due to work stress and fertility treatments. Depression related to infertility as well. Hydroxyzine has been prescribed, which can be taken every 6-8 hours as needed for anxiety or panic attacks. She prefers to try therapy before considering medication. A referral for therapy will be initiated. A referral for therapy will be initiated.  - Referral to Behavioral Health    hydrOXYzine HCl (ATARAX) 10 MG Tab, Take 10 mg by mouth every 6 hours as needed for Anxiety., Disp: , Rfl:     4. Need for vaccination  - INFLUENZA VACCINE TRI INJ (PF)  - Tdap =>8yo  IM      Return in about 3 months (around 2/6/2025), or if symptoms worsen or fail to improve, for follow up, worse release if needed.    Please note that this dictation was created using voice recognition software. I have made every reasonable attempt to correct obvious errors, but I expect that there are errors of grammar and possibly content that I did not discover before finalizing the note.

## 2024-11-06 NOTE — PATIENT INSTRUCTIONS
[facility] department at [phone]    Hydroxyzine can be taken as often as every 6 hours

## 2024-11-12 ENCOUNTER — OFFICE VISIT (OUTPATIENT)
Dept: MEDICAL GROUP | Facility: LAB | Age: 34
End: 2024-11-12
Payer: COMMERCIAL

## 2024-11-12 VITALS
TEMPERATURE: 97.8 F | WEIGHT: 128 LBS | HEIGHT: 66 IN | HEART RATE: 86 BPM | RESPIRATION RATE: 20 BRPM | DIASTOLIC BLOOD PRESSURE: 62 MMHG | SYSTOLIC BLOOD PRESSURE: 98 MMHG | OXYGEN SATURATION: 99 % | BODY MASS INDEX: 20.57 KG/M2

## 2024-11-12 DIAGNOSIS — F41.0 PANIC ATTACKS: ICD-10-CM

## 2024-11-12 DIAGNOSIS — Z02.89 ENCOUNTER FOR COMPLETION OF FORM WITH PATIENT: ICD-10-CM

## 2024-11-12 DIAGNOSIS — F33.1 MODERATE EPISODE OF RECURRENT MAJOR DEPRESSIVE DISORDER (HCC): ICD-10-CM

## 2024-11-12 DIAGNOSIS — F41.9 ANXIETY: ICD-10-CM

## 2024-11-12 PROCEDURE — 7101 PR PHYSICAL: Performed by: STUDENT IN AN ORGANIZED HEALTH CARE EDUCATION/TRAINING PROGRAM

## 2024-11-12 PROCEDURE — 99214 OFFICE O/P EST MOD 30 MIN: CPT | Performed by: STUDENT IN AN ORGANIZED HEALTH CARE EDUCATION/TRAINING PROGRAM

## 2024-11-12 ASSESSMENT — ENCOUNTER SYMPTOMS
CONSTIPATION: 0
DIARRHEA: 0
ABDOMINAL PAIN: 0
COUGH: 0
BLOOD IN STOOL: 0
SHORTNESS OF BREATH: 0
WEIGHT LOSS: 0
FEVER: 0
DEPRESSION: 1
NERVOUS/ANXIOUS: 1
CHILLS: 0
NAUSEA: 0
VOMITING: 0
INSOMNIA: 1

## 2024-11-12 ASSESSMENT — LIFESTYLE VARIABLES: SUBSTANCE_ABUSE: 0

## 2024-11-12 NOTE — PROGRESS NOTES
Verbal consent was acquired by the patient to use [employer] ambient listening note generation during this visit Yes.    Subjective:     Chief Complaint   Patient presents with    Paperwork     HPI:     History of Present Illness  The patient is a 34-year-old female here for [facility] paperwork.    She reports an improvement in her condition, although she continues to experience physical symptoms such as tension and anxiety. Her symptoms began in March 2023, with sleep disturbances and a lack of appetite. She struggles with household chores due to a lack of energy and motivation, but manages to pay her bills. She experiences social anxiety and often lacks interest in social activities. She finds herself easily irritated and exhausted, with panic attacks triggered by work-related events. Her concentration is poor and she feels a lack of energy in the mornings.    She took leave from work on 11/4/2024, citing a stressful work environment. She identifies work as a primary trigger for her panic attacks.    She has been taking hydroxyzine to aid sleep, which has been effective. She notes that her resting heart rate increases during the second phase of her cycle, but hydroxyzine has helped to lower it. She has taken hydroxyzine twice, but it leaves her feeling slightly groggy in the morning and increases her appetite. She also feels mildly weak after taking it.    Her panic attacks vary in nature; the last one left her crying uncontrollably, feeling numb, and unable to move. She also experiences lightheadedness, difficulty breathing, and chest tightness during these attacks. Her mind races and she feels hungover afterwards. The frequency of her panic attacks varies, sometimes occurring weekly, other times not for 4 or 5 weeks. The duration of her attacks also varies, lasting anywhere from 5 to 30 minutes.    She has an upcoming appointment on Thursday and is scheduled to see ALEX Lloyd, on that day.    Review of Systems  "  Constitutional:  Positive for malaise/fatigue. Negative for chills, fever and weight loss.   Respiratory:  Negative for cough and shortness of breath.    Cardiovascular:  Negative for chest pain.   Gastrointestinal:  Negative for abdominal pain, blood in stool, constipation, diarrhea, nausea and vomiting.   Skin:  Negative for rash.   Psychiatric/Behavioral:  Positive for depression. Negative for substance abuse and suicidal ideas. The patient is nervous/anxious and has insomnia.        Objective:     Exam:  BP 98/62 (BP Location: Right arm, Patient Position: Sitting, BP Cuff Size: Adult)   Pulse 86   Temp 36.6 °C (97.8 °F) (Temporal)   Resp 20   Ht 1.676 m (5' 6\")   Wt 58.1 kg (128 lb)   LMP 10/22/2024   SpO2 99%   BMI 20.66 kg/m²  Body mass index is 20.66 kg/m².    Physical Exam  Vitals reviewed.   Constitutional:       General: She is not in acute distress.     Appearance: Normal appearance. She is not ill-appearing.   HENT:      Head: Normocephalic and atraumatic.      Nose: Nose normal.      Mouth/Throat:      Mouth: Mucous membranes are moist.   Pulmonary:      Effort: Pulmonary effort is normal.   Neurological:      General: No focal deficit present.      Mental Status: She is alert and oriented to person, place, and time.   Psychiatric:      Comments: Appearance: Clothing and grooming normal.  Mood: anxious  Behavior: No psychomotor abnormalities or impulsivity. Attention is good. Patient is pleasant and cooperative.   Eye contact: good   Affect: mood-congruent  Speech/thought content: Normal speech pattern. Normal insight and reasoning, no evidence of psychotic process. Denies suicidal or homicidal thoughts.         Assessment & Plan:     34 y.o. female with the following -     1. Encounter for completion of form with patient  2. Anxiety  3. Panic attacks  4. Moderate episode of recurrent major depressive disorder (HCC)  Chronic conditions, more recently unstable requiring leave of absence from " work.  Paperwork completed and returned to patient.  Establishing with therapist this Thursday.  Continues to take hydroxyzine 10 mg as needed, at this time only for aid with sleep.    hydrOXYzine HCl (ATARAX) 10 MG Tab, Take 10 mg by mouth every 6 hours as needed for Anxiety., Disp: , Rfl:     I spent a total of 31 minutes with record review, exam, communication with the patient, and documentation of this encounter.    Return in about 12 weeks (around 2/4/2025), or if symptoms worsen or fail to improve.    Please note that this dictation was created using voice recognition software. I have made every reasonable attempt to correct obvious errors, but I expect that there are errors of grammar and possibly content that I did not discover before finalizing the note.

## 2024-12-12 ENCOUNTER — APPOINTMENT (OUTPATIENT)
Dept: MEDICAL GROUP | Facility: LAB | Age: 34
End: 2024-12-12
Payer: COMMERCIAL

## 2024-12-12 VITALS
OXYGEN SATURATION: 98 % | HEIGHT: 66 IN | SYSTOLIC BLOOD PRESSURE: 88 MMHG | WEIGHT: 127 LBS | TEMPERATURE: 98.1 F | HEART RATE: 70 BPM | BODY MASS INDEX: 20.41 KG/M2 | DIASTOLIC BLOOD PRESSURE: 50 MMHG | RESPIRATION RATE: 20 BRPM

## 2024-12-12 DIAGNOSIS — Z02.89 ENCOUNTER FOR COMPLETION OF FORM WITH PATIENT: ICD-10-CM

## 2024-12-12 DIAGNOSIS — F41.0 PANIC ATTACKS: ICD-10-CM

## 2024-12-12 DIAGNOSIS — F41.9 ANXIETY: ICD-10-CM

## 2024-12-12 PROCEDURE — 99214 OFFICE O/P EST MOD 30 MIN: CPT | Performed by: STUDENT IN AN ORGANIZED HEALTH CARE EDUCATION/TRAINING PROGRAM

## 2024-12-12 ASSESSMENT — ENCOUNTER SYMPTOMS
FEVER: 0
INSOMNIA: 1
WEIGHT LOSS: 0
DIARRHEA: 0
CHILLS: 0
ABDOMINAL PAIN: 0
SHORTNESS OF BREATH: 0
VOMITING: 0
NERVOUS/ANXIOUS: 1
NAUSEA: 0
COUGH: 0

## 2024-12-12 NOTE — PROGRESS NOTES
Verbal consent was acquired by the patient to use [employer] ambient listening note generation during this visit Yes.    Subjective:     Chief Complaint   Patient presents with    Paperwork     HPI:     History of Present Illness  The patient is a 34-year-old female who presents for paperwork completion.    She reports a decrease in stress levels, although her anxiety remains variable. She has not experienced any recent panic attacks. Sleep quality is inconsistent, but she finds therapy beneficial. She continues to experience occasional lack of concentration and irritability. She has been attending weekly therapy sessions with Sarah Camara and is considering increasing the frequency to twice weekly. Her next appointment is scheduled for today. She has discontinued hydroxyzine and is attempting to address the root cause of her symptoms without medication. She has not experienced any panic attacks since last visit.    She initiated chiropractic care on Tuesday, which has significantly alleviated her muscle tension. She plans to continue these sessions weekly until progress is noted, after which she will transition to maintenance visits every 4 to 6 weeks. She has been practicing acupuncture for the past 2 years.    She has recently begun 'cycle syncing' and 'hormone detoxification', suspecting an imbalance in her estrogen and progesterone levels. She is currently in the second week of a supplement regimen, which includes CoQ10, alysa-inositol, melatonin, NAC, vitamin D, alpha lipoic acid, and B6, taken both morning and evening. She reports a slight improvement in her energy levels and a reduction in the intensity of her hormonal fluctuations, particularly during and after ovulation. She believes her estrogen levels remain elevated. She has noticed a slight improvement in her energy levels, but her sleep quality remains inconsistent. However, she did experience good sleep following her chiropractic session.    Review of  "Systems   Constitutional:  Negative for chills, fever, malaise/fatigue and weight loss.   Respiratory:  Negative for cough and shortness of breath.    Cardiovascular:  Negative for chest pain.   Gastrointestinal:  Negative for abdominal pain, diarrhea, nausea and vomiting.   Skin:  Negative for rash.   Psychiatric/Behavioral:  The patient is nervous/anxious and has insomnia.      Objective:     Exam:  BP (!) 88/50 (BP Location: Left arm, Patient Position: Sitting, BP Cuff Size: Adult)   Pulse 70   Temp 36.7 °C (98.1 °F) (Temporal)   Resp 20   Ht 1.676 m (5' 6\")   Wt 57.6 kg (127 lb)   SpO2 98%   BMI 20.50 kg/m²  Body mass index is 20.5 kg/m².    Physical Exam  Vitals reviewed.   Constitutional:       General: She is not in acute distress.     Appearance: Normal appearance. She is not ill-appearing.   HENT:      Head: Normocephalic and atraumatic.      Nose: Nose normal.      Mouth/Throat:      Mouth: Mucous membranes are moist.   Pulmonary:      Effort: Pulmonary effort is normal.   Neurological:      General: No focal deficit present.      Mental Status: She is alert and oriented to person, place, and time.   Psychiatric:      Comments:   Appearance: Clothing and grooming normal. Hair is styled.   Mood: anxious  Behavior: Arrived on time to appointment. No psychomotor abnormalities or impulsivity. Attention is good. Patient is pleasant and cooperative.   Eye contact: good   Affect: normal  Speech/thought content: Normal speech pattern. Normal insight and reasoning, no evidence of psychotic process. Does not endorse suicidal or homicidal thoughts.         Assessment & Plan:     34 y.o. female with the following -     1. Encounter for completion of form with patient  2. Anxiety  3. Panic attacks  Chronic, improved with no further panic attacks.  Has found some benefit with supplements, dietary changes and chiropractic care.  Continues with acupuncture.  Can still continue hydroxyzine if needed for panic as " below. Continue therapy.     hydrOXYzine HCl (ATARAX) 10 MG Tab, Take 10 mg by mouth every 6 hours as needed for Anxiety., Disp: , Rfl:     Return in about 8 weeks (around 2/4/2025), or if symptoms worsen or fail to improve.    Please note that this dictation was created using voice recognition software. I have made every reasonable attempt to correct obvious errors, but I expect that there are errors of grammar and possibly content that I did not discover before finalizing the note.

## 2025-01-02 ENCOUNTER — APPOINTMENT (OUTPATIENT)
Dept: MEDICAL GROUP | Facility: LAB | Age: 35
End: 2025-01-02
Payer: COMMERCIAL

## 2025-01-02 VITALS
SYSTOLIC BLOOD PRESSURE: 100 MMHG | HEIGHT: 66 IN | RESPIRATION RATE: 20 BRPM | DIASTOLIC BLOOD PRESSURE: 60 MMHG | OXYGEN SATURATION: 95 % | TEMPERATURE: 99 F | WEIGHT: 129 LBS | BODY MASS INDEX: 20.73 KG/M2 | HEART RATE: 91 BPM

## 2025-01-02 DIAGNOSIS — N64.4 MASTODYNIA: ICD-10-CM

## 2025-01-02 DIAGNOSIS — F41.9 ANXIETY: ICD-10-CM

## 2025-01-02 DIAGNOSIS — M79.629 PAIN IN AXILLA, UNSPECIFIED LATERALITY: ICD-10-CM

## 2025-01-02 DIAGNOSIS — F33.1 MODERATE EPISODE OF RECURRENT MAJOR DEPRESSIVE DISORDER (HCC): ICD-10-CM

## 2025-01-02 DIAGNOSIS — F41.0 PANIC ATTACKS: ICD-10-CM

## 2025-01-02 PROCEDURE — 99214 OFFICE O/P EST MOD 30 MIN: CPT | Performed by: STUDENT IN AN ORGANIZED HEALTH CARE EDUCATION/TRAINING PROGRAM

## 2025-01-02 PROCEDURE — 3074F SYST BP LT 130 MM HG: CPT | Performed by: STUDENT IN AN ORGANIZED HEALTH CARE EDUCATION/TRAINING PROGRAM

## 2025-01-02 PROCEDURE — 3078F DIAST BP <80 MM HG: CPT | Performed by: STUDENT IN AN ORGANIZED HEALTH CARE EDUCATION/TRAINING PROGRAM

## 2025-01-02 RX ORDER — SERTRALINE HYDROCHLORIDE 25 MG/1
25 TABLET, FILM COATED ORAL DAILY
Qty: 90 TABLET | Refills: 0 | Status: SHIPPED | OUTPATIENT
Start: 2025-01-02

## 2025-01-02 ASSESSMENT — ENCOUNTER SYMPTOMS
WEIGHT LOSS: 0
COUGH: 0
DIARRHEA: 0
DEPRESSION: 1
NERVOUS/ANXIOUS: 1
INSOMNIA: 1
NAUSEA: 0
SHORTNESS OF BREATH: 0
CHILLS: 0
VOMITING: 0
FEVER: 0
ABDOMINAL PAIN: 0

## 2025-01-02 ASSESSMENT — ANXIETY QUESTIONNAIRES
3. WORRYING TOO MUCH ABOUT DIFFERENT THINGS: SEVERAL DAYS
7. FEELING AFRAID AS IF SOMETHING AWFUL MIGHT HAPPEN: NOT AT ALL
4. TROUBLE RELAXING: SEVERAL DAYS
6. BECOMING EASILY ANNOYED OR IRRITABLE: MORE THAN HALF THE DAYS
1. FEELING NERVOUS, ANXIOUS, OR ON EDGE: MORE THAN HALF THE DAYS
5. BEING SO RESTLESS THAT IT IS HARD TO SIT STILL: SEVERAL DAYS
2. NOT BEING ABLE TO STOP OR CONTROL WORRYING: SEVERAL DAYS
GAD7 TOTAL SCORE: 8

## 2025-01-02 ASSESSMENT — PATIENT HEALTH QUESTIONNAIRE - PHQ9
CLINICAL INTERPRETATION OF PHQ2 SCORE: 6
SUM OF ALL RESPONSES TO PHQ QUESTIONS 1-9: 16
5. POOR APPETITE OR OVEREATING: 1 - SEVERAL DAYS

## 2025-01-02 NOTE — PROGRESS NOTES
Verbal consent was acquired by the patient to use [employer] ambient listening note generation during this visit Yes.    Subjective:     Chief Complaint   Patient presents with    Paperwork    Other     Mammo     HPI:     History of Present Illness  The patient is a 34-year-old female who presents for follow-up and paperwork completion.    She has been experiencing severe depression for the past 3 weeks, which she reports as being more intense than previous episodes. The onset of this depressive episode was on 12/16/2024, characterized by a lack of energy and motivation to perform basic daily activities such as showering and getting dressed. She describes a feeling of numbness and exhaustion. She has a history of similar depressive episodes, but the current one is particularly severe.     She has been informed by her insurance company that they will not cover her short-term disability due to perceived improvement in her condition. This decision triggered a panic attack. She also experienced another panic attack after a family gathering. She finds it challenging to be around her family, particularly her father, who she finds stressful.     She is currently undergoing therapy and has been engaging in activities such as listening to music and walking to improve her mood. She has tried hydroxyzine for her panic attacks but found it unhelpful and discontinued its use.     She is open to trying long-term medication for her depression. She is currently trying to conceive and is concerned about the potential impact of medication on her pregnancy. She is also undergoing hormonal reset treatment and taking supplements. She has been using [employer] to track her hormones and continues to receive acupuncture and chiropractic treatments, which she finds helpful. She has had 2 acupuncture sessions and took a 2-week break during the holidays. She believes these treatments are beneficial as they help her manage stress, which she tends  "to internalize. She noticed an improvement in her tension levels until she took the 2-week break.    She has been experiencing biltaeral axillary and bilateral breast tenderness laterally for several months, which she describes as similar to armpit gland tenderness. She does not report any palpable masses or color changes in her breasts. She is not currently taking any estrogen supplements. She has never had a mammogram.    FAMILY HISTORY  Her grandmother had breast cancer. She has a family history of anxiety.    MEDICATIONS  Discontinued: hydroxyzine    Review of Systems   Constitutional:  Positive for malaise/fatigue. Negative for chills, fever and weight loss.   Respiratory:  Negative for cough and shortness of breath.    Cardiovascular:  Negative for chest pain.   Gastrointestinal:  Negative for abdominal pain, diarrhea, nausea and vomiting.   Skin:  Negative for rash.   Psychiatric/Behavioral:  Positive for depression. The patient is nervous/anxious and has insomnia.        Objective:     Exam:  /60 (BP Location: Left arm, Patient Position: Sitting, BP Cuff Size: Adult)   Pulse 91   Temp 37.2 °C (99 °F) (Temporal)   Resp 20   Ht 1.676 m (5' 6\")   Wt 58.5 kg (129 lb)   SpO2 95%   BMI 20.82 kg/m²  Body mass index is 20.82 kg/m².    Physical Exam  Vitals reviewed.   Constitutional:       General: She is not in acute distress.     Appearance: Normal appearance. She is not ill-appearing.   HENT:      Head: Normocephalic and atraumatic.      Nose: Nose normal.      Mouth/Throat:      Mouth: Mucous membranes are moist.   Eyes:      Conjunctiva/sclera: Conjunctivae normal.   Cardiovascular:      Rate and Rhythm: Normal rate and regular rhythm.      Heart sounds: Normal heart sounds. No murmur heard.  Pulmonary:      Effort: Pulmonary effort is normal. No respiratory distress.      Breath sounds: Normal breath sounds. No stridor. No wheezing, rhonchi or rales.   Musculoskeletal:      Cervical back: Normal " range of motion and neck supple. No rigidity or tenderness.      Right lower leg: No edema.      Left lower leg: No edema.   Lymphadenopathy:      Cervical: No cervical adenopathy.      Upper Body:      Right upper body: No supraclavicular or axillary adenopathy.      Left upper body: No supraclavicular or axillary adenopathy.      Comments: Tenderness bilaterally with axillary exam.    Neurological:      General: No focal deficit present.      Mental Status: She is alert and oriented to person, place, and time.   Psychiatric:      Comments: Appearance: Clothing and grooming normal.  Mood: anxious, depressed  Behavior: No psychomotor abnormalities or impulsivity. Attention is good. Patient is pleasant and cooperative.   Eye contact: good   Affect: mood-congruent  Speech/thought content: Normal speech pattern. Normal insight and reasoning, no evidence of psychotic process. Denies suicidal or homicidal thoughts.        Depression Screening    Little interest or pleasure in doing things?  3 - nearly every day   Feeling down, depressed , or hopeless? 3 - nearly every day   Trouble falling or staying asleep, or sleeping too much?  2 - more than half the days   Feeling tired or having little energy?  3 - nearly every day   Poor appetite or overeating?  1 - several days   Feeling bad about yourself - or that you are a failure or have let yourself or your family down? 1 - several days   Trouble concentrating on things, such as reading the newspaper or watching television? 2 - more than half the days   Moving or speaking so slowly that other people could have noticed.  Or the opposite - being so fidgety or restless that you have been moving around a lot more than usual?  1 - several days   Thoughts that you would be better off dead, or of hurting yourself?  0 - not at all   Patient Health Questionnaire Score: 16     If depressive symptoms identified deferred to follow up visit unless specifically addressed in assesment and  plan.    Interpretation of PHQ-9 Total Score   Score Severity   1-4 No Depression   5-9 Mild Depression   10-14 Moderate Depression   15-19 Moderately Severe Depression   20-27 Severe Depression    KENYATTA-7 Questionnaire    Feeling nervous, anxious, or on edge: More than half the days  Not being able to sop or control worrying: Several days  Worrying too much about different things: Several days  Trouble relaxing: Several days  Being so restless that it's hard to sit still: Several days  Becoming easily annoyed or irritable: More than half the days  Feeling afraid as if something awful might happen: Not at all  Total: 8    Interpretation of KENYATTA 7 Total Score   Score Severity :  0-4 No Anxiety   5-9 Mild Anxiety  10-14 Moderate Anxiety  15-21 Severe Anxiety    Assessment & Plan:     34 y.o. female with the following -     1. Panic attacks  2. Anxiety  3. Moderate episode of recurrent major depressive disorder (HCC)  Chronic worsening. She reports worsening depression over the past 3 weeks, exacerbated by her insurance company's denial of short-term disability. Symptoms include lack of energy, feeling numb, and difficulty performing basic tasks. She has tried hydroxyzine for panic attacks but found it made her too tired and was not helpful. A prescription for Zoloft 25 mg daily has been provided (most well studied in pregnancy). Potential side effects have been discussed. She is advised to monitor for any adverse effects and report them immediately.  She will continue with therapy, follow-up in 1 month.  - sertraline (ZOLOFT) 25 MG tablet; Take 1 Tablet by mouth every day.  Dispense: 90 Tablet; Refill: 0    4. Mastodynia  5. Pain in axilla, unspecified laterality  Chronic, agree with patient that we should investigate further with imaging.  Plan pending results.  - MA-DIAGNOSTIC MAMMO BILAT W/TOMOSYNTHESIS W/CAD; Future  - US-BREAST BILAT-COMPLETE; Future    Return in about 1 month (around 2/4/2025), or if symptoms  worsen or fail to improve.    Please note that this dictation was created using voice recognition software. I have made every reasonable attempt to correct obvious errors, but I expect that there are errors of grammar and possibly content that I did not discover before finalizing the note.

## 2025-01-23 ENCOUNTER — HOSPITAL ENCOUNTER (OUTPATIENT)
Dept: RADIOLOGY | Facility: MEDICAL CENTER | Age: 35
End: 2025-01-23
Attending: STUDENT IN AN ORGANIZED HEALTH CARE EDUCATION/TRAINING PROGRAM
Payer: COMMERCIAL

## 2025-01-23 DIAGNOSIS — N64.4 MASTODYNIA: ICD-10-CM

## 2025-01-23 DIAGNOSIS — M79.629 PAIN IN AXILLA, UNSPECIFIED LATERALITY: ICD-10-CM

## 2025-01-23 PROCEDURE — G0279 TOMOSYNTHESIS, MAMMO: HCPCS

## 2025-01-23 PROCEDURE — 76642 ULTRASOUND BREAST LIMITED: CPT | Mod: RT

## 2025-02-04 ENCOUNTER — OFFICE VISIT (OUTPATIENT)
Dept: MEDICAL GROUP | Facility: LAB | Age: 35
End: 2025-02-04
Payer: COMMERCIAL

## 2025-02-04 VITALS
TEMPERATURE: 98.6 F | HEIGHT: 66 IN | BODY MASS INDEX: 20.7 KG/M2 | OXYGEN SATURATION: 96 % | HEART RATE: 80 BPM | SYSTOLIC BLOOD PRESSURE: 96 MMHG | WEIGHT: 128.8 LBS | DIASTOLIC BLOOD PRESSURE: 62 MMHG

## 2025-02-04 DIAGNOSIS — F41.0 PANIC ATTACKS: ICD-10-CM

## 2025-02-04 DIAGNOSIS — F41.9 ANXIETY: ICD-10-CM

## 2025-02-04 DIAGNOSIS — F33.41 RECURRENT MAJOR DEPRESSIVE DISORDER, IN PARTIAL REMISSION (HCC): ICD-10-CM

## 2025-02-04 PROCEDURE — 3074F SYST BP LT 130 MM HG: CPT | Performed by: STUDENT IN AN ORGANIZED HEALTH CARE EDUCATION/TRAINING PROGRAM

## 2025-02-04 PROCEDURE — 99213 OFFICE O/P EST LOW 20 MIN: CPT | Performed by: STUDENT IN AN ORGANIZED HEALTH CARE EDUCATION/TRAINING PROGRAM

## 2025-02-04 PROCEDURE — 3078F DIAST BP <80 MM HG: CPT | Performed by: STUDENT IN AN ORGANIZED HEALTH CARE EDUCATION/TRAINING PROGRAM

## 2025-02-04 ASSESSMENT — ENCOUNTER SYMPTOMS
CHILLS: 0
SHORTNESS OF BREATH: 0
INSOMNIA: 0
ABDOMINAL PAIN: 0
VOMITING: 0
NERVOUS/ANXIOUS: 1
NAUSEA: 0
WEIGHT LOSS: 0
DEPRESSION: 1
FEVER: 0
COUGH: 0

## 2025-02-04 ASSESSMENT — ANXIETY QUESTIONNAIRES
4. TROUBLE RELAXING: SEVERAL DAYS
6. BECOMING EASILY ANNOYED OR IRRITABLE: NOT AT ALL
5. BEING SO RESTLESS THAT IT IS HARD TO SIT STILL: NOT AT ALL
GAD7 TOTAL SCORE: 2
3. WORRYING TOO MUCH ABOUT DIFFERENT THINGS: NOT AT ALL
1. FEELING NERVOUS, ANXIOUS, OR ON EDGE: SEVERAL DAYS
7. FEELING AFRAID AS IF SOMETHING AWFUL MIGHT HAPPEN: NOT AT ALL
2. NOT BEING ABLE TO STOP OR CONTROL WORRYING: NOT AT ALL

## 2025-02-04 ASSESSMENT — PATIENT HEALTH QUESTIONNAIRE - PHQ9: CLINICAL INTERPRETATION OF PHQ2 SCORE: 0

## 2025-02-04 NOTE — PROGRESS NOTES
Verbal consent was acquired by the patient to use [employer] ambient listening note generation during this visit Yes.    Subjective:     Chief Complaint   Patient presents with    Follow-Up     HPI:     History of Present Illness  The patient is a 34-year-old female who presents for a follow-up visit.    She has been managing her anxiety without the use of Zoloft, opting to rely on it only when necessary.  She ended up never taking medication.      She has been actively engaged in therapy and has implemented strategies to manage her stressors, including setting boundaries with her family. She has resumed work and experienced a panic attack last week, which were short-lived and managed through breathing exercises. She attributes the panic attack to the work environment and the pressure of returning to her previous routine. She reports that her nervous system is constantly in a state of fight or flight, and she is working on retraining her body to enter a state of rest and safety. She has noticed physical symptoms such as shaking, body tension, and sweating in certain situations.     She reports feeling exhausted after her first week back at work but notes an improvement in her energy levels. She is currently focusing on one task at work and plans to discuss a potential role change with her boss this week. She continues to see her therapist weekly and plans to continue this for the foreseeable future. She has found that exercise, including running and weightlifting, has been beneficial for her mood. She has also been tracking her hormones and has noticed significant fluctuations. She is not currently pursuing fertility treatments or seeing a gynecologist, instead opting for natural remedies.     She is taking a multivitamin and has found that magnesium powder taken before bed has improved her sleep. She has experienced some gastrointestinal upset, including loose stools, but is unsure if this is related to the  "magnesium or hormonal changes. She has been sleeping for 8 hours, which she notes is the first time in 2 to 3 years.    Review of Systems   Constitutional:  Positive for malaise/fatigue. Negative for chills, fever and weight loss.   Respiratory:  Negative for cough and shortness of breath.    Cardiovascular:  Negative for chest pain.   Gastrointestinal:  Negative for abdominal pain, nausea and vomiting.   Skin:  Negative for rash.   Psychiatric/Behavioral:  Positive for depression. Negative for suicidal ideas. The patient is nervous/anxious. The patient does not have insomnia.      Objective:     Exam:  BP 96/62   Pulse 80   Temp 37 °C (98.6 °F) (Temporal)   Ht 1.676 m (5' 6\")   Wt 58.4 kg (128 lb 12.8 oz)   LMP 01/11/2025 (Exact Date)   SpO2 96%   BMI 20.79 kg/m²  Body mass index is 20.79 kg/m².    Physical Exam  Vitals reviewed.   Constitutional:       General: She is not in acute distress.     Appearance: Normal appearance. She is not ill-appearing.   HENT:      Head: Normocephalic and atraumatic.      Nose: Nose normal.      Mouth/Throat:      Mouth: Mucous membranes are moist.   Pulmonary:      Effort: Pulmonary effort is normal.   Neurological:      General: No focal deficit present.      Mental Status: She is alert and oriented to person, place, and time.   Psychiatric:      Comments:   Appearance: Clothing and grooming normal.  Mood: doing better  Behavior: No psychomotor abnormalities or impulsivity. Attention is good. Patient is pleasant and cooperative.   Eye contact: good   Affect: normal  Speech/thought content: Normal speech pattern. Normal insight and reasoning, no evidence of psychotic process. Denies suicidal or homicidal thoughts.        PHQ-9 2 (1 falling asleep, 1 fatigue)    KENYATTA-7 Questionnaire  Feeling nervous, anxious, or on edge: Several days  Not being able to sop or control worrying: Not at all  Worrying too much about different things: Not at all  Trouble relaxing: Several " days  Being so restless that it's hard to sit still: Not at all  Becoming easily annoyed or irritable: Not at all  Feeling afraid as if something awful might happen: Not at all  Total: 2    Assessment & Plan:     34 y.o. female with the following -     1. Anxiety  2. Panic attacks  3. Recurrent major depressive disorder, in partial remission (HCC)  Chronic, improved. She has demonstrated effective management of her anxiety symptoms without the need for pharmacological intervention. She has been utilizing breath work and exercise, which have been beneficial. She experienced one panic attack upon returning to work but was able to calm down using breathing exercises. She is currently seeing a therapist weekly and plans to continue. The potential use of propranolol for situational or performance anxiety was discussed, but it was deemed unnecessary at this point. Regarding her depression, her mood has improved, and she has been able to resume physical activities such as running and weightlifting, which have positively impacted her mental health. She had not taken Zoloft and prefers to manage her symptoms naturally.  She has been taking a magnesium supplement for insomnia which is effective, discussed potential side effect of loose stools/diarrhea and she may consider titrating down if she experiences this side effect.  She was encouraged to continue her current exercise routine. She was advised to continue her current regimen of breathing exercises and physical activity. The importance of maintaining her therapy sessions was emphasized.     Return in about 6 months (around 8/4/2025), or if symptoms worsen or fail to improve, for Annual.    Please note that this dictation was created using voice recognition software. I have made every reasonable attempt to correct obvious errors, but I expect that there are errors of grammar and possibly content that I did not discover before finalizing the note.

## 2025-07-23 ENCOUNTER — OFFICE VISIT (OUTPATIENT)
Dept: MEDICAL GROUP | Facility: LAB | Age: 35
End: 2025-07-23
Payer: COMMERCIAL

## 2025-07-23 VITALS
OXYGEN SATURATION: 97 % | HEIGHT: 66 IN | WEIGHT: 133 LBS | RESPIRATION RATE: 16 BRPM | SYSTOLIC BLOOD PRESSURE: 108 MMHG | BODY MASS INDEX: 21.38 KG/M2 | HEART RATE: 68 BPM | DIASTOLIC BLOOD PRESSURE: 60 MMHG | TEMPERATURE: 98.8 F

## 2025-07-23 DIAGNOSIS — N94.6 DYSMENORRHEA: ICD-10-CM

## 2025-07-23 DIAGNOSIS — Z00.00 HEALTH CARE MAINTENANCE: ICD-10-CM

## 2025-07-23 DIAGNOSIS — Z00.00 ENCOUNTER FOR ANNUAL GENERAL MEDICAL EXAMINATION WITHOUT ABNORMAL FINDINGS IN ADULT: Primary | ICD-10-CM

## 2025-07-23 PROBLEM — F41.0 PANIC ATTACKS: Status: RESOLVED | Noted: 2024-11-06 | Resolved: 2025-07-23

## 2025-07-23 PROCEDURE — 3074F SYST BP LT 130 MM HG: CPT | Performed by: STUDENT IN AN ORGANIZED HEALTH CARE EDUCATION/TRAINING PROGRAM

## 2025-07-23 PROCEDURE — 99395 PREV VISIT EST AGE 18-39: CPT | Performed by: STUDENT IN AN ORGANIZED HEALTH CARE EDUCATION/TRAINING PROGRAM

## 2025-07-23 PROCEDURE — 3078F DIAST BP <80 MM HG: CPT | Performed by: STUDENT IN AN ORGANIZED HEALTH CARE EDUCATION/TRAINING PROGRAM

## 2025-07-23 ASSESSMENT — ENCOUNTER SYMPTOMS
NAUSEA: 0
CONSTIPATION: 0
CHILLS: 0
COUGH: 0
WEIGHT LOSS: 0
DIARRHEA: 0
SHORTNESS OF BREATH: 0
VOMITING: 0
FEVER: 0
HEARTBURN: 0

## 2025-07-23 NOTE — PROGRESS NOTES
Subjective:     Chief Complaint   Patient presents with    Annual Exam    Requesting Labs     HPI:   Melanie Luna is a 35 y.o. female who presents for annual exam.    Verbal consent was acquired by the patient to use [employer] ambient listening note generation during this visit: YES    History of Present Illness  The patient is a 35-year-old female who presents for her annual exam and is requesting lab work.    She has been maintaining a healthy diet, with an increased intake of animal protein so wants to check cholesterol due to fmhx. She has also incorporated weightlifting into her exercise routine. Her mood has improved significantly, and she reports no recent panic attacks. She is currently taking multivitamins. She maintains good oral hygiene and uses sunscreen regularly. Her weight has remained stable at around 130 pounds. She has abstained from alcohol for the past year and has significantly reduced her intake since 2021. She does not consume coffee. She reports no heartburn or constipation and has regular bowel movements.     She underwent a mammogram in 01/2025, which showed dense breast tissue but no signs of cancer. An ultrasound was also performed. Breast tenderness (bilaterally), improved.    Over the past six months, her menstrual cycles have been regular, with no cramps or other issues. However, she has experienced gastrointestinal problems in the past few months, which have worsened her menstrual symptoms. She experienced cramps today, which prevented her from working. She has not seen her gynecologist recently. She was previously treated with doxycycline for endometritis, which caused gastrointestinal upset. She is currently trying natural treatments and has undergone a Viome test, which identified cauliflower and cashews as triggers for her stomach issues. Avoiding these foods has improved her symptoms. She plans to continue this diet for six months to assess its effectiveness. She has  tried other treatments for her gastrointestinal issues without success. Her symptoms have improved recently, but she experienced bloating and stomach pain at night three to four months ago. The pain was primarily on the left side, sometimes higher up. She has been following the recommended diet and taking supplements, which have helped. She had previously avoided gluten due to sensitivity but has been able to tolerate bread occasionally without stomach upset since starting the supplements. She began changing her diet at the end of 06/2025, which has helped manage her symptoms. She avoids processed foods and has switched from cardio exercises to weightlifting and walking, as cardio exercises exacerbate her nervous system symptoms.    She is still trying to conceive and is taking multivitamins. She has visited a fertility clinic and underwent intrauterine insemination twice, but the hormones used in the treatment caused significant side effects. She is now exploring natural methods of conception. She suspects she has endometriosis due to painful periods and pain during ovulation. She also experiences pain during intercourse (certain positions) and bowel movements during her period. She uses a heating pad for pain relief and avoids pain medications unless necessary. She has found Midol helpful for menstrual pain. She reports no abnormal discharge but notes the presence of blood clots in her menstrual blood. Her periods are heavy on the first day, requiring a super tampon every two to three hours, but they lighten up after that. She has been undergoing acupuncture for three years, which she believes is beneficial for hormonal balance, but it has not helped her conceive. She recently started lymphatic massages for detoxification.    Her thyroid function was checked last year, and she was prescribed thyroid medication for fertility, but she did not respond well to it.    Ob-Gyn/ History:    Patient has GYN provider:  yes  /Para: G0  History of abnormal pap smears: no    - Last Menstrual Period: 2025  - Frequency and Flow: Heavy on the first day, requiring a super tampon every two to three hours, then lightens up  - Menstrual Pain: More significant, uses a heating pad. Tries to avoid oral medication.     Current Contraceptive Method: none-desires to conceive. Sexually active: yes. Number of partners in the past year: 1   Dyspareunia: yes-certain positions, not consistent.   Folate intake: yes    Health Maintenance  Osteoporosis Screen/ DEXA: Due at 65  Diet/exercise: Reviewed.   Cholesterol Screening:   Lab Results   Component Value Date    LDL 64 2021   Diabetes Screenin    Cancer screening  Colorectal Cancer Screening: Due at 45.   Lung Cancer Screening: Not indicated  Cervical Cancer Screening: Up to date  Breast Cancer Screening: Due at 40.     Infectious disease screening/Immunizations  --Immunizations: Reviewed with patient.   --Hepatitis C Screening: Complete/negative prior  --HIV Screening: Complete/negative prior. Risk factors: no    She  has a past medical history of Anxiety, Depression, Endometritis, Panic attacks (2024), and Vaso vagal episode.  She  has a past surgical history that includes other.    Family History   Problem Relation Age of Onset    Thyroid Mother         hypo    Hyperlipidemia Mother     DVT Mother     Pulmonary Embolism Mother         med related    Hypertension Father     Hyperlipidemia Father     Anxiety disorder Sister     Depression Brother     Pulmonary Embolism Maternal Aunt     Stroke Maternal Grandmother     Breast Cancer Maternal Grandmother     Alzheimer's Disease Maternal Grandmother     Gout Maternal Grandfather     Suicide Attempts Maternal Grandfather         cause of death    Dementia Paternal Grandmother     Stroke Paternal Grandfather     Diabetes Paternal Grandfather     Colon Cancer Neg Hx        Social History     Socioeconomic History    Marital  status:      Spouse name: Not on file    Number of children: 0    Years of education: Not on file    Highest education level: Bachelor's degree (e.g., BA, AB, BS)   Occupational History    Not on file   Tobacco Use    Smoking status: Never    Smokeless tobacco: Never   Vaping Use    Vaping status: Never Used   Substance and Sexual Activity    Alcohol use: Not Currently     Alcohol/week: 3.0 oz     Types: 5 Standard drinks or equivalent per week    Drug use: Never    Sexual activity: Yes     Partners: Male     Birth control/protection: None   Other Topics Concern    Not on file   Social History Narrative    Works for [employer]     Social Drivers of Health     Financial Resource Strain: Low Risk  (11/5/2024)    Overall Financial Resource Strain (CARDIA)     Difficulty of Paying Living Expenses: Not very hard   Food Insecurity: No Food Insecurity (11/5/2024)    Hunger Vital Sign     Worried About Running Out of Food in the Last Year: Never true     Ran Out of Food in the Last Year: Never true   Transportation Needs: No Transportation Needs (11/5/2024)    PRAPARE - Transportation     Lack of Transportation (Medical): No     Lack of Transportation (Non-Medical): No   Physical Activity: Insufficiently Active (11/5/2024)    Exercise Vital Sign     Days of Exercise per Week: 4 days     Minutes of Exercise per Session: 30 min   Stress: Stress Concern Present (11/5/2024)    [employer] - Occupational Stress Questionnaire     Feeling of Stress : Very much   Social Connections: Moderately Isolated (11/5/2024)    Social Connection and Isolation Panel [NHANES]     Frequency of Communication with Friends and Family: More than three times a week     Frequency of Social Gatherings with Friends and Family: Patient declined     Attends Rastafarian Services: Never     Active Member of Clubs or Organizations: No     Attends Club or Organization Meetings: Patient declined     Marital Status:   "  Intimate Partner Violence: Not on file   Housing Stability: Low Risk  (11/5/2024)    Housing Stability Vital Sign     Unable to Pay for Housing in the Last Year: No     Number of Times Moved in the Last Year: 0     Homeless in the Last Year: No       Patient Active Problem List    Diagnosis Date Noted    Dysmenorrhea 07/23/2025    Mastodynia 01/02/2025    Axillary pain 01/02/2025    Anxiety 11/06/2024    Recurrent major depressive disorder, in partial remission (HCC) 11/06/2024    Infertility, female 11/06/2024    Gluten intolerance 11/06/2024    Plantar wart 05/20/2021    Temporomandibular joint disorder 03/10/2010     Current Medications[1]  Allergies[2]    Review of Systems   Constitutional:  Negative for chills, fever, malaise/fatigue and weight loss.   Respiratory:  Negative for cough and shortness of breath.    Cardiovascular:  Negative for chest pain.   Gastrointestinal:  Negative for constipation, diarrhea, heartburn, nausea and vomiting.   Skin:  Negative for rash.        Objective:     /60 (BP Location: Right arm, Patient Position: Sitting, BP Cuff Size: Adult)   Pulse 68   Temp 37.1 °C (98.8 °F) (Temporal)   Resp 16   Ht 1.676 m (5' 6\")   Wt 60.3 kg (133 lb)   LMP 07/23/2025 (Exact Date)   SpO2 97%   BMI 21.47 kg/m²   Body mass index is 21.47 kg/m².  Wt Readings from Last 4 Encounters:   07/23/25 60.3 kg (133 lb)   02/04/25 58.4 kg (128 lb 12.8 oz)   01/02/25 58.5 kg (129 lb)   12/12/24 57.6 kg (127 lb)       Physical Exam  Vitals reviewed.   Constitutional:       General: She is not in acute distress.     Appearance: Normal appearance. She is not ill-appearing.   HENT:      Head: Normocephalic and atraumatic.      Right Ear: Tympanic membrane, ear canal and external ear normal.      Left Ear: Tympanic membrane, ear canal and external ear normal.      Nose: Nose normal.      Mouth/Throat:      Mouth: Mucous membranes are moist.      Pharynx: No oropharyngeal exudate or posterior " oropharyngeal erythema.   Eyes:      General: No scleral icterus.     Conjunctiva/sclera: Conjunctivae normal.   Cardiovascular:      Rate and Rhythm: Normal rate and regular rhythm.      Heart sounds: Normal heart sounds. No murmur heard.  Pulmonary:      Effort: Pulmonary effort is normal. No respiratory distress.      Breath sounds: Normal breath sounds. No wheezing, rhonchi or rales.   Abdominal:      General: Abdomen is flat. Bowel sounds are normal. There is no distension.      Palpations: Abdomen is soft. There is no mass.      Tenderness: There is abdominal tenderness (mild general inferior). There is no guarding or rebound.   Musculoskeletal:      Cervical back: Normal range of motion and neck supple. No rigidity or tenderness.      Right lower leg: No edema.      Left lower leg: No edema.   Lymphadenopathy:      Cervical: No cervical adenopathy.   Skin:     General: Skin is warm and dry.      Findings: No rash.   Neurological:      General: No focal deficit present.      Mental Status: She is alert and oriented to person, place, and time.      Gait: Gait normal.   Psychiatric:         Mood and Affect: Mood normal.         Behavior: Behavior normal.         Thought Content: Thought content normal.         Reviewed most recent/relevant labs/imaging.    Assessment and Plan:     1. Encounter for annual general medical examination without abnormal findings in adult  HCM: Reviewed with patient as above.  Immunizations reviewed, patient directed to the pharmacy if vaccines not available in clinic.  Age-appropriate anticipatory guidance discussed: sun screen, dental visits, healthy diet/exercise.    2. Health care maintenance  - Comp Metabolic Panel; Future  - TSH WITH REFLEX TO FT4; Future  - Lipid Profile; Future  - CBC WITH DIFFERENTIAL; Future    3. Dysmenorrhea  Patient with history of infertility, symptoms suggestive of endometriosis.  - Managing symptoms with heating pad, can consider NSAIDs like ibuprofen  or naproxen.  Patient prefers to avoid oral medications and has been making some dietary changes that seem to be beneficial.  - Follow-up with gynecologist if symptoms persist for further evaluation, discussed that endometriosis is a clinical diagnosis without laparoscopic confirmation.    Follow-up: Return in 1 year (on 7/23/2026), or if symptoms worsen or fail to improve, for Annual.           [1]   Current Outpatient Medications   Medication Sig Dispense Refill    multivitamin Tab Take 1 Tablet by mouth every day.       No current facility-administered medications for this visit.   [2]   Allergies  Allergen Reactions    Bactrim Ds Rash     Other reaction(s): Rash, Unspecified  Pt began noting rash after beginning septra ds for kidney infection. 12/2015  Pt began noting rash after beginning septra ds for kidney infection. 12/2015    Sulfa Drugs Rash